# Patient Record
Sex: MALE | Race: WHITE | NOT HISPANIC OR LATINO
[De-identification: names, ages, dates, MRNs, and addresses within clinical notes are randomized per-mention and may not be internally consistent; named-entity substitution may affect disease eponyms.]

---

## 2020-09-10 PROBLEM — Z00.00 ENCOUNTER FOR PREVENTIVE HEALTH EXAMINATION: Status: ACTIVE | Noted: 2020-09-10

## 2020-09-12 ENCOUNTER — APPOINTMENT (OUTPATIENT)
Dept: MRI IMAGING | Facility: CLINIC | Age: 60
End: 2020-09-12

## 2020-09-22 ENCOUNTER — OUTPATIENT (OUTPATIENT)
Dept: OUTPATIENT SERVICES | Facility: HOSPITAL | Age: 60
LOS: 1 days | End: 2020-09-22
Payer: COMMERCIAL

## 2020-09-22 ENCOUNTER — APPOINTMENT (OUTPATIENT)
Dept: MRI IMAGING | Facility: CLINIC | Age: 60
End: 2020-09-22
Payer: COMMERCIAL

## 2020-09-22 ENCOUNTER — RESULT REVIEW (OUTPATIENT)
Age: 60
End: 2020-09-22

## 2020-09-22 DIAGNOSIS — Z00.8 ENCOUNTER FOR OTHER GENERAL EXAMINATION: ICD-10-CM

## 2020-09-22 PROCEDURE — 73720 MRI LWR EXTREMITY W/O&W/DYE: CPT

## 2020-09-22 PROCEDURE — 73720 MRI LWR EXTREMITY W/O&W/DYE: CPT | Mod: 26,LT

## 2020-09-22 PROCEDURE — A9585: CPT

## 2020-09-23 ENCOUNTER — INPATIENT (INPATIENT)
Facility: HOSPITAL | Age: 60
LOS: 3 days | Discharge: HOME | DRG: 463 | End: 2020-09-27
Attending: INTERNAL MEDICINE | Admitting: INTERNAL MEDICINE
Payer: COMMERCIAL

## 2020-09-23 VITALS
WEIGHT: 235.01 LBS | OXYGEN SATURATION: 99 % | SYSTOLIC BLOOD PRESSURE: 133 MMHG | HEART RATE: 77 BPM | RESPIRATION RATE: 18 BRPM | HEIGHT: 74 IN | TEMPERATURE: 99 F | DIASTOLIC BLOOD PRESSURE: 86 MMHG

## 2020-09-23 DIAGNOSIS — M86.9 OSTEOMYELITIS, UNSPECIFIED: ICD-10-CM

## 2020-09-23 LAB
ALBUMIN SERPL ELPH-MCNC: 4.4 G/DL — SIGNIFICANT CHANGE UP (ref 3.3–5)
ALP SERPL-CCNC: 67 U/L — SIGNIFICANT CHANGE UP (ref 40–120)
ALT FLD-CCNC: 27 U/L — SIGNIFICANT CHANGE UP (ref 10–45)
ANION GAP SERPL CALC-SCNC: 12 MMOL/L — SIGNIFICANT CHANGE UP (ref 5–17)
APTT BLD: 33.3 SEC — SIGNIFICANT CHANGE UP (ref 27.5–35.5)
AST SERPL-CCNC: 37 U/L — SIGNIFICANT CHANGE UP (ref 10–40)
BASOPHILS # BLD AUTO: 0.13 K/UL — SIGNIFICANT CHANGE UP (ref 0–0.2)
BASOPHILS NFR BLD AUTO: 1.6 % — SIGNIFICANT CHANGE UP (ref 0–2)
BILIRUB SERPL-MCNC: 0.4 MG/DL — SIGNIFICANT CHANGE UP (ref 0.2–1.2)
BLD GP AB SCN SERPL QL: NEGATIVE — SIGNIFICANT CHANGE UP
BUN SERPL-MCNC: 8 MG/DL — SIGNIFICANT CHANGE UP (ref 7–23)
CALCIUM SERPL-MCNC: 10 MG/DL — SIGNIFICANT CHANGE UP (ref 8.4–10.5)
CHLORIDE SERPL-SCNC: 93 MMOL/L — LOW (ref 96–108)
CO2 SERPL-SCNC: 25 MMOL/L — SIGNIFICANT CHANGE UP (ref 22–31)
CREAT SERPL-MCNC: 0.89 MG/DL — SIGNIFICANT CHANGE UP (ref 0.5–1.3)
EOSINOPHIL # BLD AUTO: 0.07 K/UL — SIGNIFICANT CHANGE UP (ref 0–0.5)
EOSINOPHIL NFR BLD AUTO: 0.9 % — SIGNIFICANT CHANGE UP (ref 0–6)
GLUCOSE SERPL-MCNC: 85 MG/DL — SIGNIFICANT CHANGE UP (ref 70–99)
HCT VFR BLD CALC: 35.5 % — LOW (ref 39–50)
HGB BLD-MCNC: 12.4 G/DL — LOW (ref 13–17)
IMM GRANULOCYTES NFR BLD AUTO: 0.2 % — SIGNIFICANT CHANGE UP (ref 0–1.5)
INR BLD: 0.93 RATIO — SIGNIFICANT CHANGE UP (ref 0.88–1.16)
LYMPHOCYTES # BLD AUTO: 2.15 K/UL — SIGNIFICANT CHANGE UP (ref 1–3.3)
LYMPHOCYTES # BLD AUTO: 26.5 % — SIGNIFICANT CHANGE UP (ref 13–44)
MCHC RBC-ENTMCNC: 33.1 PG — SIGNIFICANT CHANGE UP (ref 27–34)
MCHC RBC-ENTMCNC: 34.9 GM/DL — SIGNIFICANT CHANGE UP (ref 32–36)
MCV RBC AUTO: 94.7 FL — SIGNIFICANT CHANGE UP (ref 80–100)
MONOCYTES # BLD AUTO: 1.18 K/UL — HIGH (ref 0–0.9)
MONOCYTES NFR BLD AUTO: 14.5 % — HIGH (ref 2–14)
NEUTROPHILS # BLD AUTO: 4.56 K/UL — SIGNIFICANT CHANGE UP (ref 1.8–7.4)
NEUTROPHILS NFR BLD AUTO: 56.3 % — SIGNIFICANT CHANGE UP (ref 43–77)
NRBC # BLD: 0 /100 WBCS — SIGNIFICANT CHANGE UP (ref 0–0)
PLATELET # BLD AUTO: 196 K/UL — SIGNIFICANT CHANGE UP (ref 150–400)
POTASSIUM SERPL-MCNC: 4.4 MMOL/L — SIGNIFICANT CHANGE UP (ref 3.5–5.3)
POTASSIUM SERPL-SCNC: 4.4 MMOL/L — SIGNIFICANT CHANGE UP (ref 3.5–5.3)
PROT SERPL-MCNC: 7.2 G/DL — SIGNIFICANT CHANGE UP (ref 6–8.3)
PROTHROM AB SERPL-ACNC: 11.1 SEC — SIGNIFICANT CHANGE UP (ref 10.6–13.6)
RBC # BLD: 3.75 M/UL — LOW (ref 4.2–5.8)
RBC # FLD: 12.3 % — SIGNIFICANT CHANGE UP (ref 10.3–14.5)
RH IG SCN BLD-IMP: NEGATIVE — SIGNIFICANT CHANGE UP
SARS-COV-2 RNA SPEC QL NAA+PROBE: DETECTED
SODIUM SERPL-SCNC: 130 MMOL/L — LOW (ref 135–145)
WBC # BLD: 8.11 K/UL — SIGNIFICANT CHANGE UP (ref 3.8–10.5)
WBC # FLD AUTO: 8.11 K/UL — SIGNIFICANT CHANGE UP (ref 3.8–10.5)

## 2020-09-23 PROCEDURE — 71045 X-RAY EXAM CHEST 1 VIEW: CPT | Mod: 26

## 2020-09-23 PROCEDURE — 93010 ELECTROCARDIOGRAM REPORT: CPT

## 2020-09-23 PROCEDURE — 73630 X-RAY EXAM OF FOOT: CPT | Mod: 26,LT

## 2020-09-23 PROCEDURE — 99285 EMERGENCY DEPT VISIT HI MDM: CPT

## 2020-09-23 RX ORDER — LOSARTAN POTASSIUM 100 MG/1
50 TABLET, FILM COATED ORAL
Refills: 0 | Status: DISCONTINUED | OUTPATIENT
Start: 2020-09-23 | End: 2020-09-24

## 2020-09-23 RX ORDER — VANCOMYCIN HCL 1 G
1000 VIAL (EA) INTRAVENOUS ONCE
Refills: 0 | Status: COMPLETED | OUTPATIENT
Start: 2020-09-23 | End: 2020-09-23

## 2020-09-23 RX ORDER — CEFEPIME 1 G/1
1000 INJECTION, POWDER, FOR SOLUTION INTRAMUSCULAR; INTRAVENOUS EVERY 8 HOURS
Refills: 0 | Status: DISCONTINUED | OUTPATIENT
Start: 2020-09-23 | End: 2020-09-25

## 2020-09-23 RX ORDER — CEFEPIME 1 G/1
INJECTION, POWDER, FOR SOLUTION INTRAMUSCULAR; INTRAVENOUS
Refills: 0 | Status: DISCONTINUED | OUTPATIENT
Start: 2020-09-23 | End: 2020-09-23

## 2020-09-23 RX ORDER — HYDROCHLOROTHIAZIDE 25 MG
12.5 TABLET ORAL DAILY
Refills: 0 | Status: DISCONTINUED | OUTPATIENT
Start: 2020-09-23 | End: 2020-09-24

## 2020-09-23 RX ORDER — LOSARTAN POTASSIUM 100 MG/1
12.5 TABLET, FILM COATED ORAL ONCE
Refills: 0 | Status: COMPLETED | OUTPATIENT
Start: 2020-09-23 | End: 2020-09-23

## 2020-09-23 RX ORDER — INFLUENZA VIRUS VACCINE 15; 15; 15; 15 UG/.5ML; UG/.5ML; UG/.5ML; UG/.5ML
0.5 SUSPENSION INTRAMUSCULAR ONCE
Refills: 0 | Status: COMPLETED | OUTPATIENT
Start: 2020-09-23 | End: 2020-09-23

## 2020-09-23 RX ORDER — CEFEPIME 1 G/1
2000 INJECTION, POWDER, FOR SOLUTION INTRAMUSCULAR; INTRAVENOUS ONCE
Refills: 0 | Status: COMPLETED | OUTPATIENT
Start: 2020-09-23 | End: 2020-09-23

## 2020-09-23 RX ORDER — HEPARIN SODIUM 5000 [USP'U]/ML
5000 INJECTION INTRAVENOUS; SUBCUTANEOUS EVERY 12 HOURS
Refills: 0 | Status: DISCONTINUED | OUTPATIENT
Start: 2020-09-23 | End: 2020-09-27

## 2020-09-23 RX ORDER — CEFEPIME 1 G/1
2000 INJECTION, POWDER, FOR SOLUTION INTRAMUSCULAR; INTRAVENOUS EVERY 12 HOURS
Refills: 0 | Status: DISCONTINUED | OUTPATIENT
Start: 2020-09-23 | End: 2020-09-23

## 2020-09-23 RX ORDER — AMLODIPINE BESYLATE 2.5 MG/1
2.5 TABLET ORAL DAILY
Refills: 0 | Status: DISCONTINUED | OUTPATIENT
Start: 2020-09-23 | End: 2020-09-24

## 2020-09-23 RX ORDER — LOSARTAN POTASSIUM 100 MG/1
50 TABLET, FILM COATED ORAL DAILY
Refills: 0 | Status: DISCONTINUED | OUTPATIENT
Start: 2020-09-23 | End: 2020-09-23

## 2020-09-23 RX ADMIN — LOSARTAN POTASSIUM 50 MILLIGRAM(S): 100 TABLET, FILM COATED ORAL at 18:35

## 2020-09-23 RX ADMIN — CEFEPIME 100 MILLIGRAM(S): 1 INJECTION, POWDER, FOR SOLUTION INTRAMUSCULAR; INTRAVENOUS at 12:25

## 2020-09-23 RX ADMIN — HEPARIN SODIUM 5000 UNIT(S): 5000 INJECTION INTRAVENOUS; SUBCUTANEOUS at 17:33

## 2020-09-23 RX ADMIN — AMLODIPINE BESYLATE 2.5 MILLIGRAM(S): 2.5 TABLET ORAL at 17:33

## 2020-09-23 RX ADMIN — LOSARTAN POTASSIUM 12.5 MILLIGRAM(S): 100 TABLET, FILM COATED ORAL at 13:25

## 2020-09-23 RX ADMIN — CEFEPIME 100 MILLIGRAM(S): 1 INJECTION, POWDER, FOR SOLUTION INTRAMUSCULAR; INTRAVENOUS at 21:39

## 2020-09-23 RX ADMIN — Medication 12.5 MILLIGRAM(S): at 17:33

## 2020-09-23 RX ADMIN — Medication 250 MILLIGRAM(S): at 13:25

## 2020-09-23 NOTE — CONSULT NOTE ADULT - ASSESSMENT
60y Male complaining of foot injury. presenting with left third toe infection diagnosed by MRI to have osteomyelitis a couple days a now presenting for surgery with podiatry dr. sheikh,      no other complaints, afebrile, not currently on antibiotics notes he just ttok ? ab, a week ago.   Denies any pain in the foot, declines medications for pain, no other complaints at this time.   ot with long history htn , last stress test years ago, not very active, but no chest pain.  increase losartan to 50 mg bid for better bp control  lipid  tsh  dvt prophylaxis  pt will be clear for foot surgery  awaiting ecg  doubt PVD , pt with excellent pulses

## 2020-09-23 NOTE — CONSULT NOTE ADULT - SUBJECTIVE AND OBJECTIVE BOX
CHIEF COMPLAINT:Patient is a 60y old  Male who presents with a chief complaint of osteo  toe (23 Sep 2020 15:42)      HPI:    Chief Complaint: foot injury.    60y Male complaining of foot injury.presenting with left third toe infection diagnosed by MRI to have osteomyelitis a couple days a now presenting for surgery with podiatry dr. sheikh,      no other complaints, afebrile, not currently on antibiotics notes he just ttok ? ab, a week ago.   Denies any pain in the foot, declines medications for pain, no other complaints at this time.   ot with long history htn , last stress test years ago, not very active, but no chest pain.    PAST MEDICAL & SURGICAL HISTORY:  HTN (hypertension)    No significant past surgical history        MEDICATIONS  (STANDING):  amLODIPine   Tablet 2.5 milliGRAM(s) Oral daily  cefepime   IVPB 1000 milliGRAM(s) IV Intermittent every 8 hours  heparin   Injectable 5000 Unit(s) SubCutaneous every 12 hours  hydrochlorothiazide 12.5 milliGRAM(s) Oral daily  influenza   Vaccine 0.5 milliLiter(s) IntraMuscular once  losartan 50 milliGRAM(s) Oral daily    MEDICATIONS  (PRN):      FAMILY HISTORY:      SOCIAL HISTORY:    [ ] Non-smoker  [ ] Smoker  [ ] Alcohol    Allergies    penicillin (Unknown)    Intolerances    	    REVIEW OF SYSTEMS:  CONSTITUTIONAL: No fever, weight loss, or fatigue  EYES: No eye pain, visual disturbances, or discharge  ENT:  No difficulty hearing, tinnitus, vertigo; No sinus or throat pain  NECK: No pain or stiffness  RESPIRATORY: No cough, wheezing, chills or hemoptysis; No Shortness of Breath  CARDIOVASCULAR: No chest pain, palpitations, passing out, dizziness, or leg swelling  GASTROINTESTINAL: No abdominal or epigastric pain. No nausea, vomiting, or hematemesis; No diarrhea or constipation. No melena or hematochezia.  GENITOURINARY: No dysuria, frequency, hematuria, or incontinence  NEUROLOGICAL: No headaches, memory loss, loss of strength, numbness, or tremors  SKIN: No itching, burning, rashes, or lesions   LYMPH Nodes: No enlarged glands  ENDOCRINE: No heat or cold intolerance; No hair loss  MUSCULOSKELETAL: No joint pain or swelling; No muscle, back, + extremity pain/ l toe  PSYCHIATRIC: No depression, anxiety, mood swings, or difficulty sleeping  HEME/LYMPH: No easy bruising, or bleeding gums  ALLERGY AND IMMUNOLOGIC: No hives or eczema	    [ ] All others negative	  [ ] Unable to obtain    PHYSICAL EXAM:  T(C): 37 (09-23-20 @ 16:39), Max: 37 (09-23-20 @ 09:46)  HR: 90 (09-23-20 @ 16:46) (75 - 90)  BP: 170/110 (09-23-20 @ 16:46) (133/86 - 172/103)  RR: 18 (09-23-20 @ 16:46) (18 - 19)  SpO2: 98% (09-23-20 @ 16:46) (98% - 100%)  Wt(kg): --  I&O's Summary      Appearance: Normal	  HEENT:   Normal oral mucosa, PERRL, EOMI	  Lymphatic: No lymphadenopathy  Cardiovascular: Normal S1 S2, No JVD, + murmurs, No edema  Respiratory: Lungs clear to auscultation	  Psychiatry: A & O x 3, Mood & affect appropriate  Gastrointestinal:  Soft, Non-tender, + BS	  Skin: No rashes, No ecchymoses, No cyanosis	  Neurologic: Non-focal  Extremities: Normal range of motion, No clubbing, cyanosis or edema  Vascular: Peripheral pulses palpable 2+ bilaterally    TELEMETRY: 	    ECG:  	  RADIOLOGY:  OTHER: 	  	  LABS:	 	    CARDIAC MARKERS:                              12.4   8.11  )-----------( 196      ( 23 Sep 2020 11:41 )             35.5     09-23    130<L>  |  93<L>  |  8   ----------------------------<  85  4.4   |  25  |  0.89    Ca    10.0      23 Sep 2020 11:41    TPro  7.2  /  Alb  4.4  /  TBili  0.4  /  DBili  x   /  AST  37  /  ALT  27  /  AlkPhos  67  09-23    proBNP:   Lipid Profile:   HgA1c:   TSH:   PT/INR - ( 23 Sep 2020 11:41 )   PT: 11.1 sec;   INR: 0.93 ratio         PTT - ( 23 Sep 2020 11:41 )  PTT:33.3 sec    PREVIOUS DIAGNOSTIC TESTING:    COVID-19 PCR . (09.23.20 @ 12:19)    COVID-19 PCR: Detected: This test has been validated by [employer] to be accurate;  though it has not been FDA cleared/approved by the usual pathway.  As with all laboratory tests, results should be correlated with clinical  findings.  https://www.fda.gov/media/009095/download  https://www.fda.gov/media/427304/download    < from: MR Walls w/wo IV Cont, Left (09.22.20 @ 11:58) >  1. Osteomyelitis of the third distal phalanx with faint edema of the third middle phalanx that may be reactive or reflect additional infection.  2. No osteomyelitis of the second toe.

## 2020-09-23 NOTE — H&P ADULT - NSHPPHYSICALEXAM_GEN_ALL_CORE
PHYSICAL EXAMINATION:  Vital Signs Last 24 Hrs  T(C): 36.8 (23 Sep 2020 13:10), Max: 37 (23 Sep 2020 09:46)  T(F): 98.2 (23 Sep 2020 13:10), Max: 98.6 (23 Sep 2020 09:46)  HR: 75 (23 Sep 2020 13:10) (75 - 77)  BP: 172/103 (23 Sep 2020 13:10) (133/86 - 172/103)  BP(mean): --  RR: 19 (23 Sep 2020 13:10) (18 - 19)  SpO2: 100% (23 Sep 2020 13:10) (99% - 100%)  CAPILLARY BLOOD GLUCOSE            GENERAL: NAD, well-groomed,  HEAD:  atraumatic, normocephalic  EYES: sclera anicteric  ENMT: mucous membranes moist  NECK: supple, No JVD  CHEST/LUNG: clear to auscultation bilaterally;    no      rales   ,   no rhonchi,   HEART: normal S1, S2  ABDOMEN: BS+, soft, ND, NT   EXTREMITIES:    no    edema    b/l LEs  ulcer.  left  3rd  toe  NEURO: awake, ,     moves all extremities  SKIN: no     rash

## 2020-09-23 NOTE — CONSULT NOTE ADULT - SUBJECTIVE AND OBJECTIVE BOX
HPI:   Patient is a 60y male with a past history of HTN who was sent to ER for definitive treatment of left 3rd toe osteo.  He has a toes ulcer x weeks, treated with oral antibiotics at home.No fever or chills.MRI a few days ago with osteo of left al 3rd toe.  He was evaluated by podiatry, wound probed, and debrided, cultures sent, vanco and cefepime advised.  His NP Covid 19 PCR is positive, no known positive contacts or respiratory symptoms.    REVIEW OF SYSTEMS:  All other review of systems negative (Comprehensive ROS)    PAST MEDICAL & SURGICAL HISTORY:  HTN (hypertension)    No significant past surgical history        Allergies    penicillin (Unknown)    Intolerances        Antimicrobials Day #  :day 1  cefepime   IVPB 1000 milliGRAM(s) IV Intermittent every 8 hours    Other Medications:  amLODIPine   Tablet 2.5 milliGRAM(s) Oral daily  heparin   Injectable 5000 Unit(s) SubCutaneous every 12 hours  hydrochlorothiazide 12.5 milliGRAM(s) Oral daily  losartan 50 milliGRAM(s) Oral daily      FAMILY HISTORY:NC      SOCIAL HISTORY:  Smoking: ex  ETOH:  socialg Use: x   Single     T(F): 97.6 (09-23-20 @ 14:41), Max: 98.6 (09-23-20 @ 09:46)  HR: 77 (09-23-20 @ 14:41)  BP: 166/104 (09-23-20 @ 14:41)  RR: 18 (09-23-20 @ 14:41)  SpO2: 98% (09-23-20 @ 14:41)  Wt(kg): --    PHYSICAL EXAM:  General: alert, no acute distress  Eyes:  anicteric, no conjunctival injection, no discharge  Oropharynx: no lesions or injection 	  Neck: supple, without adenopathy  Lungs: clear to auscultation  Heart: regular rate and rhythm; no murmur, rubs or gallops  Abdomen: soft, nondistended, nontender, without mass or organomegaly  Skin: no lesions  Extremities: no clubbing, cyanosis, or edema  Neurologic: alert, oriented, moves all extremities  Lt 3rd toe with distal 2x1 cm wound, no pus, swollen digit, no cellulitis             12.4   8.11  )-----------( 196      ( 23 Sep 2020 11:41 )             35.5     09-23    130<L>  |  93<L>  |  8   ----------------------------<  85  4.4   |  25  |  0.89    Ca    10.0      23 Sep 2020 11:41    TPro  7.2  /  Alb  4.4  /  TBili  0.4  /  DBili  x   /  AST  37  /  ALT  27  /  AlkPhos  67  09-23    LIVER FUNCTIONS - ( 23 Sep 2020 11:41 )  Alb: 4.4 g/dL / Pro: 7.2 g/dL / ALK PHOS: 67 U/L / ALT: 27 U/L / AST: 37 U/L / GGT: x               MICROBIOLOGY:  RECENT CULTURES:        RADIOLOGY REVIEWED:   < from: MR Foot w/wo IV Cont, Left (09.22.20 @ 11:58) >  IMPRESSION:  1. Osteomyelitis of the third distal phalanx with faint edema of the third middle phalanx that may be reactive or reflect additional infection.  2. No osteomyelitis of the second toe.  3. An email was sent to Dr. SHIRLENE TELLO on 9/22/2020 12:48 PM.    CXR preliminary: negative

## 2020-09-23 NOTE — CONSULT NOTE ADULT - ASSESSMENT
60M w/ L foot distal digital wound  - Pt seen and evaluated  - Afebrile, labs pending  - L foot distal 3rd digit wound, 2.0x1.0cm, depth to bone wound bed fibrogranular, no malodor, periwound erythematous, etiology 2/2 pressure, neuropathy  - Excisional debridement of L foot distal digital wound to the level of but not beyond subq with sanguinous drainage, culture obtained  - Rec start vanco/cefepime as patient has digital dactylitis w/ erythema to MPJ  - Booked for L foot partial third ray resection w/ Dr. Wolff at 12:30PM  - Please document medical clearance / optimization for procedure with light sedation  - d/w attending 60M w/ L foot distal digital wound  - Pt seen and evaluated  - Afebrile, labs pending  - L foot distal 3rd digit wound, 2.0x1.0cm, depth to bone wound bed fibrogranular, no malodor, periwound erythematous, etiology 2/2 pressure, neuropathy  - Excisional debridement of L foot distal digital wound to the level of but not beyond subq with sanguinous drainage, culture obtained  - Rec start vanco/cefepime as patient has digital dactylitis w/ erythema to MPJ  - Booked for L foot partial third ray resection w/ Dr. Wolff at 12:30PM Friday 9/25/20  - Please document medical clearance / optimization for procedure with light sedation  - d/w attending

## 2020-09-23 NOTE — ED ADULT NURSE NOTE - OBJECTIVE STATEMENT
61 y/o male with pmhx of htn sent by wound care MD for surgical cleaning of L 3rd toe.  per pt, his MRI revealed osteomyelitis.  erythema and edema with scaly skin noted to affected toe.  pt denies any fevers, chills or purulent exudate at this time.  pt is awake, alert and responsive to all stimuli.  no sob or respiratory distress noted.  vss.  pt able to ambulate fully bearing weight to affected foot.  pt resting in bed comfortably with safety precautions in place.  will continue to monitor.

## 2020-09-23 NOTE — H&P ADULT - ASSESSMENT
60M    h/o HTN    sent to e r  . 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 Osteomyelitis of the third distal phalanx with faint edema of the third middle phalanx that may be reactive or reflect additional infection.  2. No osteomyelitis of the second toe.  3. An email was sent to Dr. SHIRLENE TELLO on 9/22/2020 12:48 PM.  < end of copied text >

## 2020-09-23 NOTE — PROVIDER CONTACT NOTE (OTHER) - ACTION/TREATMENT ORDERED:
provider notified. amlodipine & hydrochlorothiazide. losartan give 30 mins later. will continue to monitor. reassess in 1 hour.

## 2020-09-23 NOTE — H&P ADULT - NSHPLABSRESULTS_GEN_ALL_CORE
LABS:                        12.4   8.11  )-----------( 196      ( 23 Sep 2020 11:41 )             35.5     09-23    130<L>  |  93<L>  |  8   ----------------------------<  85  4.4   |  25  |  0.89    Ca    10.0      23 Sep 2020 11:41    TPro  7.2  /  Alb  4.4  /  TBili  0.4  /  DBili  x   /  AST  37  /  ALT  27  /  AlkPhos  67  09-23    PT/INR - ( 23 Sep 2020 11:41 )   PT: 11.1 sec;   INR: 0.93 ratio         PTT - ( 23 Sep 2020 11:41 )  PTT:33.3 sec

## 2020-09-23 NOTE — CONSULT NOTE ADULT - ASSESSMENT
61 yo male with osteo of left 3rd toe.  No signs of systemic infection.  Covid PCR positive, asymptomatic.  CXR looks clear, no indication for treatment.  This could be old exposure, he is not aware of any infected contacts.  Will await serology, if positive it would suggest infection at least 10-14 days ago, however the positive PCR will mandate isolation regardless of antibody status.  Vanco and cefepime reasonable pending planned toe resection.  Culture sent today of wound can be used to adjust antibiotics.

## 2020-09-23 NOTE — CONSULT NOTE ADULT - SUBJECTIVE AND OBJECTIVE BOX
Patient is a 60y old  Male who presents with a chief complaint of     HPI:      PAST MEDICAL & SURGICAL HISTORY:  HTN (hypertension)    No significant past surgical history        MEDICATIONS  (STANDING):  cefepime   IVPB      vancomycin  IVPB 1000 milliGRAM(s) IV Intermittent once    MEDICATIONS  (PRN):      Allergies    penicillin (Unknown)    Intolerances        VITALS:    Vital Signs Last 24 Hrs  T(C): 37 (23 Sep 2020 09:46), Max: 37 (23 Sep 2020 09:46)  T(F): 98.6 (23 Sep 2020 09:46), Max: 98.6 (23 Sep 2020 09:46)  HR: 77 (23 Sep 2020 09:46) (77 - 77)  BP: 133/86 (23 Sep 2020 09:46) (133/86 - 133/86)  BP(mean): --  RR: 18 (23 Sep 2020 09:46) (18 - 18)  SpO2: 99% (23 Sep 2020 09:46) (99% - 99%)    LABS:                CAPILLARY BLOOD GLUCOSE              LOWER EXTREMITY PHYSICAL EXAM:    Vascular: DP/PT 2/4, B/L, CFT <3 seconds B/L, Temperature gradient warm to cool, B/L.   Neuro: Epicritic sensation absent to the level of digits, B/L.  Musculoskeletal/Ortho: L foot 3rd digit dactylitis  Skin: L foot distal 3rd digit wound, 2.0x1.0cm, depth to bone wound bed fibrogranular, no malodor, periwound erythematous, etiology 2/2 pressure, neuropathy    RADIOLOGY & ADDITIONAL STUDIES:

## 2020-09-23 NOTE — CONSULT NOTE ADULT - SUBJECTIVE AND OBJECTIVE BOX
SMILEY HENDERSON 60y Male  MRN-[id number]    Patient is a 60y old  Male who presents with a chief complaint of osteo  toe (23 Sep 2020 13:32)      HPI:    Chief Complaint: foot injury.    60y Male complaining of foot injury.    : Pmhx HTN      presenting with left third toe infection diagnosed by MRI to have osteomyelitis a couple days ago,      now presenting for surgery with podiatry dr. sheikh,      no other complaints, afebrile, not currently on antibiotics notes he just ttok ? ab, a week ago.   Denies any pain in the foot, declines medications for pain, no other complaints at this time.   (23 Sep 2020 13:32)      PAST MEDICAL & SURGICAL HISTORY:  HTN (hypertension)    No significant past surgical history        Allergies    penicillin (Unknown)    Intolerances        ANTIMICROBIALS:  cefepime   IVPB 1000 every 8 hours      MEDICATIONS  (STANDING):  amLODIPine   Tablet 2.5 milliGRAM(s) Oral daily  cefepime   IVPB 1000 milliGRAM(s) IV Intermittent every 8 hours  heparin   Injectable 5000 Unit(s) SubCutaneous every 12 hours  hydrochlorothiazide 12.5 milliGRAM(s) Oral daily  losartan 50 milliGRAM(s) Oral daily      Social History  Smoking:  Etoh:  Drug use:      FAMILY HISTORY:      Vital Signs Last 24 Hrs  T(C): 36.4 (23 Sep 2020 14:41), Max: 37 (23 Sep 2020 09:46)  T(F): 97.6 (23 Sep 2020 14:41), Max: 98.6 (23 Sep 2020 09:46)  HR: 77 (23 Sep 2020 14:41) (75 - 77)  BP: 166/104 (23 Sep 2020 14:41) (133/86 - 172/103)  BP(mean): --  RR: 18 (23 Sep 2020 14:41) (18 - 19)  SpO2: 98% (23 Sep 2020 14:41) (98% - 100%)    CBC Full  -  ( 23 Sep 2020 11:41 )  WBC Count : 8.11 K/uL  RBC Count : 3.75 M/uL  Hemoglobin : 12.4 g/dL  Hematocrit : 35.5 %  Platelet Count - Automated : 196 K/uL  Mean Cell Volume : 94.7 fl  Mean Cell Hemoglobin : 33.1 pg  Mean Cell Hemoglobin Concentration : 34.9 gm/dL  Auto Neutrophil # : 4.56 K/uL  Auto Lymphocyte # : 2.15 K/uL  Auto Monocyte # : 1.18 K/uL  Auto Eosinophil # : 0.07 K/uL  Auto Basophil # : 0.13 K/uL  Auto Neutrophil % : 56.3 %  Auto Lymphocyte % : 26.5 %  Auto Monocyte % : 14.5 %  Auto Eosinophil % : 0.9 %  Auto Basophil % : 1.6 %    09-23    130<L>  |  93<L>  |  8   ----------------------------<  85  4.4   |  25  |  0.89    Ca    10.0      23 Sep 2020 11:41    TPro  7.2  /  Alb  4.4  /  TBili  0.4  /  DBili  x   /  AST  37  /  ALT  27  /  AlkPhos  67  09-23    LIVER FUNCTIONS - ( 23 Sep 2020 11:41 )  Alb: 4.4 g/dL / Pro: 7.2 g/dL / ALK PHOS: 67 U/L / ALT: 27 U/L / AST: 37 U/L / GGT: x               MICROBIOLOGY:    COVID-19 PCR . (09.23.20 @ 12:19)   COVID-19 PCR: Detected: This test has been validated by [employer] to be accurate;   though it has not been FDA cleared/approved by the usual pathway.   As with all laboratory tests, results should be correlated with clinical   findings.       RADIOLOGY  < from: MR Foot w/wo IV Cont, Left (09.22.20 @ 11:58) >  1. Osteomyelitis of the third distal phalanx with faint edema of the third middle phalanx that may be reactive or reflect additional infection.  2. No osteomyelitis of the second toe.    < end of copied text >

## 2020-09-23 NOTE — ED PROVIDER NOTE - PHYSICAL EXAMINATION
Gen: NAD, non-toxic, conversational  Eyes: PERRLA, EOMI   HENT: Normocephalic, atraumatic. External ears normal, no rhinorrhea, moist mucous membranes.   CV: RRR, no M/R/G  Resp: CTAB, non-labored, speaking without difficulty on room air  Abd: soft, non tender, non rigid, no guarding or rebound tenderness  Back: No CVAT bilaterally, no midline ttp  Skin: dry, wwp   Ext: left third toe with dry necrotic appearing base, no purulence or drainage, mildly swollen, able to move in good range of motion, DP intact on ipsilateral and contrallateral sides.   Neuro: AOx3, speech is fluent and appropriate  Psych: Mood "okay", affect euthymic

## 2020-09-23 NOTE — ED PROVIDER NOTE - CLINICAL SUMMARY MEDICAL DECISION MAKING FREE TEXT BOX
60M hx of left hip surg approx 1 mo ago well healing, now with toe infection, found to have osteomyelitis on MRI a couple days ago now presenting for surgery with Dr. Smith, no other complaints at this time, podiatry aware of him pt to be admitted for treatment, will order preops, ekg, admit.

## 2020-09-23 NOTE — ED PROVIDER NOTE - OBJECTIVE STATEMENT
Pmhx HTN presenting with left third toe infection diagnosed by MRI to have osteomyelitis a couple days ago, now presenting for surgery with podiatry dr. sheikh, no other complaints, afebrile, not currently on antibiotics notes he just finished his course a week ago. Denies any pain in the foot, declines medications for pain, no other complaints at this time.

## 2020-09-23 NOTE — H&P ADULT - HISTORY OF PRESENT ILLNESS
Chief Complaint: foot injury.    60y Male complaining of foot injury.    : Pmhx HTN      presenting with left third toe infection diagnosed by MRI to have osteomyelitis a couple days ago,      now presenting for surgery with podiatry dr. sheikh,      no other complaints, afebrile, not currently on antibiotics notes he just ttok ? ab, a week ago.   Denies any pain in the foot, declines medications for pain, no other complaints at this time.

## 2020-09-23 NOTE — PROVIDER CONTACT NOTE (OTHER) - ASSESSMENT
Pt is resting in bed denies any pain. BP is 172/103. Pt denies HA, dizziness, or blurred vision. Pt states he takes losartan at home and did not have a chance to take it today.

## 2020-09-24 LAB
ANION GAP SERPL CALC-SCNC: 14 MMOL/L — SIGNIFICANT CHANGE UP (ref 5–17)
BUN SERPL-MCNC: 10 MG/DL — SIGNIFICANT CHANGE UP (ref 7–23)
CALCIUM SERPL-MCNC: 9.9 MG/DL — SIGNIFICANT CHANGE UP (ref 8.4–10.5)
CHLORIDE SERPL-SCNC: 92 MMOL/L — LOW (ref 96–108)
CHOLEST SERPL-MCNC: 168 MG/DL — SIGNIFICANT CHANGE UP (ref 10–199)
CO2 SERPL-SCNC: 23 MMOL/L — SIGNIFICANT CHANGE UP (ref 22–31)
CREAT SERPL-MCNC: 0.79 MG/DL — SIGNIFICANT CHANGE UP (ref 0.5–1.3)
CRP SERPL-MCNC: 0.41 MG/DL — HIGH (ref 0–0.4)
ERYTHROCYTE [SEDIMENTATION RATE] IN BLOOD: 40 MM/HR — HIGH (ref 0–20)
GLUCOSE SERPL-MCNC: 103 MG/DL — HIGH (ref 70–99)
HCT VFR BLD CALC: 34.9 % — LOW (ref 39–50)
HCV AB S/CO SERPL IA: 0.08 S/CO — SIGNIFICANT CHANGE UP (ref 0–0.99)
HCV AB SERPL-IMP: SIGNIFICANT CHANGE UP
HDLC SERPL-MCNC: 72 MG/DL — SIGNIFICANT CHANGE UP
HGB BLD-MCNC: 12.7 G/DL — LOW (ref 13–17)
LIPID PNL WITH DIRECT LDL SERPL: 85 MG/DL — SIGNIFICANT CHANGE UP
MCHC RBC-ENTMCNC: 34 PG — SIGNIFICANT CHANGE UP (ref 27–34)
MCHC RBC-ENTMCNC: 36.4 GM/DL — HIGH (ref 32–36)
MCV RBC AUTO: 93.6 FL — SIGNIFICANT CHANGE UP (ref 80–100)
NRBC # BLD: 0 /100 WBCS — SIGNIFICANT CHANGE UP (ref 0–0)
PLATELET # BLD AUTO: 176 K/UL — SIGNIFICANT CHANGE UP (ref 150–400)
POTASSIUM SERPL-MCNC: 3.9 MMOL/L — SIGNIFICANT CHANGE UP (ref 3.5–5.3)
POTASSIUM SERPL-SCNC: 3.9 MMOL/L — SIGNIFICANT CHANGE UP (ref 3.5–5.3)
RBC # BLD: 3.73 M/UL — LOW (ref 4.2–5.8)
RBC # FLD: 12.2 % — SIGNIFICANT CHANGE UP (ref 10.3–14.5)
SODIUM SERPL-SCNC: 129 MMOL/L — LOW (ref 135–145)
TOTAL CHOLESTEROL/HDL RATIO MEASUREMENT: 2.3 RATIO — LOW (ref 3.4–9.6)
TRIGL SERPL-MCNC: 58 MG/DL — SIGNIFICANT CHANGE UP (ref 10–149)
TSH SERPL-MCNC: 1.35 UIU/ML — SIGNIFICANT CHANGE UP (ref 0.27–4.2)
WBC # BLD: 5.85 K/UL — SIGNIFICANT CHANGE UP (ref 3.8–10.5)
WBC # FLD AUTO: 5.85 K/UL — SIGNIFICANT CHANGE UP (ref 3.8–10.5)

## 2020-09-24 RX ORDER — AMLODIPINE BESYLATE 2.5 MG/1
5 TABLET ORAL DAILY
Refills: 0 | Status: DISCONTINUED | OUTPATIENT
Start: 2020-09-24 | End: 2020-09-27

## 2020-09-24 RX ORDER — LOSARTAN POTASSIUM 100 MG/1
50 TABLET, FILM COATED ORAL
Refills: 0 | Status: DISCONTINUED | OUTPATIENT
Start: 2020-09-24 | End: 2020-09-27

## 2020-09-24 RX ORDER — SODIUM CHLORIDE 9 MG/ML
1000 INJECTION INTRAMUSCULAR; INTRAVENOUS; SUBCUTANEOUS
Refills: 0 | Status: DISCONTINUED | OUTPATIENT
Start: 2020-09-24 | End: 2020-09-25

## 2020-09-24 RX ORDER — LOSARTAN POTASSIUM 100 MG/1
50 TABLET, FILM COATED ORAL DAILY
Refills: 0 | Status: DISCONTINUED | OUTPATIENT
Start: 2020-09-24 | End: 2020-09-24

## 2020-09-24 RX ORDER — CHLORHEXIDINE GLUCONATE 213 G/1000ML
1 SOLUTION TOPICAL ONCE
Refills: 0 | Status: COMPLETED | OUTPATIENT
Start: 2020-09-24 | End: 2020-09-24

## 2020-09-24 RX ADMIN — HEPARIN SODIUM 5000 UNIT(S): 5000 INJECTION INTRAVENOUS; SUBCUTANEOUS at 05:07

## 2020-09-24 RX ADMIN — HEPARIN SODIUM 5000 UNIT(S): 5000 INJECTION INTRAVENOUS; SUBCUTANEOUS at 17:35

## 2020-09-24 RX ADMIN — CEFEPIME 100 MILLIGRAM(S): 1 INJECTION, POWDER, FOR SOLUTION INTRAMUSCULAR; INTRAVENOUS at 13:12

## 2020-09-24 RX ADMIN — LOSARTAN POTASSIUM 50 MILLIGRAM(S): 100 TABLET, FILM COATED ORAL at 05:07

## 2020-09-24 RX ADMIN — LOSARTAN POTASSIUM 50 MILLIGRAM(S): 100 TABLET, FILM COATED ORAL at 17:35

## 2020-09-24 RX ADMIN — CHLORHEXIDINE GLUCONATE 1 APPLICATION(S): 213 SOLUTION TOPICAL at 21:50

## 2020-09-24 RX ADMIN — CEFEPIME 100 MILLIGRAM(S): 1 INJECTION, POWDER, FOR SOLUTION INTRAMUSCULAR; INTRAVENOUS at 05:04

## 2020-09-24 RX ADMIN — CEFEPIME 100 MILLIGRAM(S): 1 INJECTION, POWDER, FOR SOLUTION INTRAMUSCULAR; INTRAVENOUS at 21:48

## 2020-09-24 NOTE — PATIENT PROFILE ADULT - MONEY FOR FOOD
Lydia's case was discussed at Valve conference.    The plan will be for Lydia to proceed with TAVR     [facility] Valve  Size 23  Approach: TF  -----------------------------------------------  Date: 9/24/2020    Time of Call: 8:17 AM     Diagnosis:  Severe aortic stenosis     [ TORB ] Ordering provider: Leonard Pastor MD  Order:   PAC  CASE REQUEST:  TAVR       Order received by: Elizabeth Mata RN     _____________________________________            
no

## 2020-09-24 NOTE — PROGRESS NOTE ADULT - SUBJECTIVE AND OBJECTIVE BOX
CC: f/u for osteo toe    Patient reports    REVIEW OF SYSTEMS:  All other review of systems negative (Comprehensive ROS)    Antimicrobials Day #  :2  cefepime   IVPB 1000 milliGRAM(s) IV Intermittent every 8 hours    Other Medications Reviewed    T(F): 98.1 (09-24-20 @ 08:46), Max: 98.6 (09-23-20 @ 16:39)  HR: 99 (09-24-20 @ 08:46)  BP: 152/107 (09-24-20 @ 08:46)  RR: 18 (09-24-20 @ 08:46)  SpO2: 99% (09-24-20 @ 08:46)  Wt(kg): --    PHYSICAL EXAM:  General: alert, no acute distress  Eyes:  anicteric, no conjunctival injection, no discharge  Oropharynx: no lesions or injection 	  Neck: supple, without adenopathy  Lungs: clear to auscultation  Heart: regular rate and rhythm; no murmur, rubs or gallops  Abdomen: soft, nondistended, nontender, without mass or organomegaly  Skin: no lesions  Extremities: no clubbing, cyanosis, or edema. left 3rd toe ulcer, distal area swelling, no drainage  Neurologic: alert, oriented, moves all extremities    LAB RESULTS:                        12.7   5.85  )-----------( 176      ( 24 Sep 2020 05:20 )             34.9     09-24    129<L>  |  92<L>  |  10  ----------------------------<  103<H>  3.9   |  23  |  0.79    Ca    9.9      24 Sep 2020 05:20    TPro  7.2  /  Alb  4.4  /  TBili  0.4  /  DBili  x   /  AST  37  /  ALT  27  /  AlkPhos  67  09-23    LIVER FUNCTIONS - ( 23 Sep 2020 11:41 )  Alb: 4.4 g/dL / Pro: 7.2 g/dL / ALK PHOS: 67 U/L / ALT: 27 U/L / AST: 37 U/L / GGT: x             MICROBIOLOGY:  RECENT CULTURES:      RADIOLOGY REVIEWED:      < from: MR Foot w/wo IV Cont, Left (09.22.20 @ 11:58) >  IMPRESSION:  1. Osteomyelitis of the third distal phalanx with faint edema of the third middle phalanx that may be reactive or reflect additional infection.  2. No osteomyelitis of the second toe.  3. An email was sent to Dr. SHIRLENE TELLO on 9/22/2020 12:48 PM.        < end of copied text >          Assessment:  Patient with contiguous focus om of the left 3rd toe distal phalynx and assymptomatic or old covid. Cefepime is adequate coverage for now. No need for vancomycin  Plan:  continue cefepime  await podiatry plans for toe amputation  await covid ab, no indication for specific covid tx

## 2020-09-24 NOTE — PROGRESS NOTE ADULT - SUBJECTIVE AND OBJECTIVE BOX
CARDIOLOGY     PROGRESS  NOTE   ________________________________________________    CHIEF COMPLAINT:Patient is a 60y old  Male who presents with a chief complaint of osteo  toe (24 Sep 2020 08:07)    	  REVIEW OF SYSTEMS:  CONSTITUTIONAL: No fever, weight loss, or fatigue  EYES: No eye pain, visual disturbances, or discharge  ENT:  No difficulty hearing, tinnitus, vertigo; No sinus or throat pain  NECK: No pain or stiffness  RESPIRATORY: No cough, wheezing, chills or hemoptysis; No Shortness of Breath  CARDIOVASCULAR: No chest pain, palpitations, passing out, dizziness, or leg swelling  GASTROINTESTINAL: No abdominal or epigastric pain. No nausea, vomiting, or hematemesis; No diarrhea or constipation. No melena or hematochezia.  GENITOURINARY: No dysuria, frequency, hematuria, or incontinence  NEUROLOGICAL: No headaches, memory loss, loss of strength, numbness, or tremors  SKIN: No itching, burning, rashes, or lesions   LYMPH Nodes: No enlarged glands  ENDOCRINE: No heat or cold intolerance; No hair loss  MUSCULOSKELETAL: No joint pain or swelling; No muscle, back, or extremity pain  PSYCHIATRIC: No depression, anxiety, mood swings, or difficulty sleeping  HEME/LYMPH: No easy bruising, or bleeding gums  ALLERGY AND IMMUNOLOGIC: No hives or eczema	    [ ] All others negative	  [ ] Unable to obtain    PHYSICAL EXAM:  T(C): 36.5 (09-24-20 @ 04:48), Max: 37 (09-23-20 @ 09:46)  HR: 73 (09-24-20 @ 04:48) (73 - 90)  BP: 137/85 (09-24-20 @ 04:48) (133/86 - 172/103)  RR: 18 (09-24-20 @ 04:48) (18 - 19)  SpO2: 97% (09-24-20 @ 04:48) (97% - 100%)  Wt(kg): --  I&O's Summary    23 Sep 2020 07:01  -  24 Sep 2020 07:00  --------------------------------------------------------  IN: 0 mL / OUT: 575 mL / NET: -575 mL        Appearance: Normal	  HEENT:   Normal oral mucosa, PERRL, EOMI	  Lymphatic: No lymphadenopathy  Cardiovascular: Normal S1 S2, No JVD, No murmurs, No edema  Respiratory: Lungs clear to auscultation	  Psychiatry: A & O x 3, Mood & affect appropriate  Gastrointestinal:  Soft, Non-tender, + BS	  Skin: No rashes, No ecchymoses, No cyanosis	  Neurologic: Non-focal  Extremities: Normal range of motion, No clubbing, cyanosis or edema, +toe wound/osteo    MEDICATIONS  (STANDING):  amLODIPine   Tablet 5 milliGRAM(s) Oral daily  cefepime   IVPB 1000 milliGRAM(s) IV Intermittent every 8 hours  heparin   Injectable 5000 Unit(s) SubCutaneous every 12 hours  influenza   Vaccine 0.5 milliLiter(s) IntraMuscular once  losartan 50 milliGRAM(s) Oral two times a day      TELEMETRY: 	    ECG:  	  RADIOLOGY:  OTHER: 	  	  LABS:	 	    CARDIAC MARKERS:                                12.7   5.85  )-----------( 176      ( 24 Sep 2020 05:20 )             34.9     09-24    129<L>  |  92<L>  |  10  ----------------------------<  103<H>  3.9   |  23  |  0.79    Ca    9.9      24 Sep 2020 05:20    TPro  7.2  /  Alb  4.4  /  TBili  0.4  /  DBili  x   /  AST  37  /  ALT  27  /  AlkPhos  67  09-23    proBNP:   Lipid Profile:   HgA1c:   TSH:   PT/INR - ( 23 Sep 2020 11:41 )   PT: 11.1 sec;   INR: 0.93 ratio         PTT - ( 23 Sep 2020 11:41 )  PTT:33.3 sec      Assessment and plan  ---------------------------  60y Male complaining of foot injury. presenting with left third toe infection diagnosed by MRI to have osteomyelitis a couple days a now presenting for surgery with podiatry dr. sheikh,      no other complaints, afebrile, not currently on antibiotics notes he just ttok ? ab, a week ago.   Denies any pain in the foot, declines medications for pain, no other complaints at this time.   ot with long history htn , last stress test years ago, not very active, but no chest pain.  increase losartan to 50 mg bid for better bp control  lipid  tsh  dvt prophylaxis  pt is clear for foot surgery cardiac wise  awaiting ecg  doubt PVD , pt with excellent pulses   hyponatremia may consider iv hydration  bp is better controlled  continue abx

## 2020-09-24 NOTE — PROGRESS NOTE ADULT - ASSESSMENT
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 Osteomyelitis of the third distal phalanx with faint edema of the third middle phalanx that may be reactive or reflect additional infection.  2. No osteomyelitis of the second toe.  3. An email was sent to Dr. SHIRLENE TELLO on 9/22/2020 12:48 PM.  < end of copied text >

## 2020-09-24 NOTE — PROGRESS NOTE ADULT - SUBJECTIVE AND OBJECTIVE BOX
afberile/  room air  sat is  97      REVIEW OF SYSTEMS:  GEN: no fever,    no chills  RESP: no SOB,   no cough  CVS: no chest pain,   no palpitations  GI: no abdominal pain,   no nausea,   no vomiting,   no constipation,   no diarrhea  : no dysuria,   no frequency  NEURO: no headache,   no dizziness  PSYCH: no depression,   not anxious  Derm : no rash    MEDICATIONS  (STANDING):  amLODIPine   Tablet 5 milliGRAM(s) Oral daily  cefepime   IVPB 1000 milliGRAM(s) IV Intermittent every 8 hours  heparin   Injectable 5000 Unit(s) SubCutaneous every 12 hours  influenza   Vaccine 0.5 milliLiter(s) IntraMuscular once  losartan 50 milliGRAM(s) Oral two times a day    MEDICATIONS  (PRN):      Vital Signs Last 24 Hrs  T(C): 36.5 (24 Sep 2020 04:48), Max: 37 (23 Sep 2020 09:46)  T(F): 97.7 (24 Sep 2020 04:48), Max: 98.6 (23 Sep 2020 09:46)  HR: 73 (24 Sep 2020 04:48) (73 - 90)  BP: 137/85 (24 Sep 2020 04:48) (133/86 - 172/103)  BP(mean): --  RR: 18 (24 Sep 2020 04:48) (18 - 19)  SpO2: 97% (24 Sep 2020 04:48) (97% - 100%)  CAPILLARY BLOOD GLUCOSE        I&O's Summary    23 Sep 2020 07:01  -  24 Sep 2020 07:00  --------------------------------------------------------  IN: 0 mL / OUT: 575 mL / NET: -575 mL    EXTREMITIES:     no   edema/  tpe, osteo      LABS:                        12.7   5.85  )-----------( 176      ( 24 Sep 2020 05:20 )             34.9     09-24    129<L>  |  92<L>  |  10  ----------------------------<  103<H>  3.9   |  23  |  0.79    Ca    9.9      24 Sep 2020 05:20    TPro  7.2  /  Alb  4.4  /  TBili  0.4  /  DBili  x   /  AST  37  /  ALT  27  /  AlkPhos  67  09-23    PT/INR - ( 23 Sep 2020 11:41 )   PT: 11.1 sec;   INR: 0.93 ratio         PTT - ( 23 Sep 2020 11:41 )  PTT:33.3 sec                        Consultant(s) Notes Reviewed:      Care Discussed with Consultants/Other Providers:

## 2020-09-24 NOTE — PROGRESS NOTE ADULT - ASSESSMENT
60M w/ L foot distal digital wound  - Pt seen and evaluated  - Afebrile, WBC 5, ESR/CRP pending  - L foot distal 3rd digit wound, 2.0x1.0cm, depth to bone wound bed fibrogranular, no malodor, periwound erythematous, etiology 2/2 pressure, neuropathy  -cont. IV Abx  - Booked for L foot partial third ray resection w/ Dr. Wolff at 12:30PM Friday 9/25/20  - Cardiac clearance documented since 9/24  - NPO after midnight with pre-op labs to be drawn in AM  - Seen w/ attending

## 2020-09-24 NOTE — CHART NOTE - NSCHARTNOTEFT_GEN_A_CORE
Non-emergent left foot surgery to be cancelled tomorrow 9/25 due to COVID positive status. Recommend discharge on IV abx for 2 weeks per ID, and return to hospital once COVID negative for left foot partial third ray resection.

## 2020-09-24 NOTE — PROGRESS NOTE ADULT - SUBJECTIVE AND OBJECTIVE BOX
Patient is a 60y old  Male who presents with a chief complaint of osteo  toe (24 Sep 2020 08:15)       INTERVAL HPI/OVERNIGHT EVENTS:  Patient seen and evaluated at bedside.  Pt is resting comfortable in NAD. Denies N/V/F/C.    Allergies    penicillin (Unknown)    Intolerances        Vital Signs Last 24 Hrs  T(C): 36.7 (24 Sep 2020 08:46), Max: 37 (23 Sep 2020 16:39)  T(F): 98.1 (24 Sep 2020 08:46), Max: 98.6 (23 Sep 2020 16:39)  HR: 99 (24 Sep 2020 08:46) (73 - 99)  BP: 152/107 (24 Sep 2020 08:46) (137/85 - 172/103)  BP(mean): --  RR: 18 (24 Sep 2020 08:46) (18 - 19)  SpO2: 99% (24 Sep 2020 08:46) (97% - 100%)    LABS:                        12.7   5.85  )-----------( 176      ( 24 Sep 2020 05:20 )             34.9     09-24    129<L>  |  92<L>  |  10  ----------------------------<  103<H>  3.9   |  23  |  0.79    Ca    9.9      24 Sep 2020 05:20    TPro  7.2  /  Alb  4.4  /  TBili  0.4  /  DBili  x   /  AST  37  /  ALT  27  /  AlkPhos  67  09-23    PT/INR - ( 23 Sep 2020 11:41 )   PT: 11.1 sec;   INR: 0.93 ratio         PTT - ( 23 Sep 2020 11:41 )  PTT:33.3 sec    CAPILLARY BLOOD GLUCOSE          Lower Extremity Physical Exam:  Vascular: DP/PT 2/4, B/L, CFT <3 seconds B/L, Temperature gradient warm to cool, B/L.   Neuro: Epicritic sensation absent to the level of digits, B/L.  Musculoskeletal/Ortho: L foot 3rd digit dactylitis  Skin: L foot distal 3rd digit wound, 2.0x1.0cm, depth to bone wound bed fibrogranular, no malodor, periwound erythematous, etiology 2/2 pressure, neuropathy    RADIOLOGY & ADDITIONAL TESTS:  < from: Xray Foot AP + Lateral + Oblique, Left (09.23.20 @ 12:52) >    EXAM:  FOOT COMPLETE LEFT (MIN 3 VIEWS)                            PROCEDURE DATE:  09/23/2020            INTERPRETATION:  History: Left foot third digit wound extending to bone.    3 views of the left foot were performed.    Findings:    There is prominent soft tissue swelling about the third toe. There is cortical erosion and destruction of the tuft of the distal phalanx of the third toe consistent with osteomyelitis. Chronic appearing erosion is seen along the distal articular surface of thefirst proximal phalanx which is nonspecific. Postsurgical deformity of the fourth toe proximal interphalangeal joint is noted.    Impression:    Cortical erosion and destruction of the tuft of the distal talus of the third toe consistent with osteomyelitis.                  ANGELITO BLACK M.D., ATTENDING RADIOLOGIST  This document has been electronically signed. Sep 23 2020  4:52PM    < end of copied text >

## 2020-09-25 ENCOUNTER — TRANSCRIPTION ENCOUNTER (OUTPATIENT)
Age: 60
End: 2020-09-25

## 2020-09-25 LAB
ANION GAP SERPL CALC-SCNC: 14 MMOL/L — SIGNIFICANT CHANGE UP (ref 5–17)
ANION GAP SERPL CALC-SCNC: 15 MMOL/L — SIGNIFICANT CHANGE UP (ref 5–17)
APPEARANCE UR: CLEAR — SIGNIFICANT CHANGE UP
APTT BLD: 31.3 SEC — SIGNIFICANT CHANGE UP (ref 27.5–35.5)
BILIRUB UR-MCNC: NEGATIVE — SIGNIFICANT CHANGE UP
BUN SERPL-MCNC: 15 MG/DL — SIGNIFICANT CHANGE UP (ref 7–23)
BUN SERPL-MCNC: 17 MG/DL — SIGNIFICANT CHANGE UP (ref 7–23)
CALCIUM SERPL-MCNC: 10 MG/DL — SIGNIFICANT CHANGE UP (ref 8.4–10.5)
CALCIUM SERPL-MCNC: 10.2 MG/DL — SIGNIFICANT CHANGE UP (ref 8.4–10.5)
CHLORIDE SERPL-SCNC: 89 MMOL/L — LOW (ref 96–108)
CHLORIDE SERPL-SCNC: 91 MMOL/L — LOW (ref 96–108)
CO2 SERPL-SCNC: 19 MMOL/L — LOW (ref 22–31)
CO2 SERPL-SCNC: 22 MMOL/L — SIGNIFICANT CHANGE UP (ref 22–31)
COLOR SPEC: SIGNIFICANT CHANGE UP
CREAT ?TM UR-MCNC: 70 MG/DL — SIGNIFICANT CHANGE UP
CREAT SERPL-MCNC: 0.86 MG/DL — SIGNIFICANT CHANGE UP (ref 0.5–1.3)
CREAT SERPL-MCNC: 0.89 MG/DL — SIGNIFICANT CHANGE UP (ref 0.5–1.3)
DIFF PNL FLD: NEGATIVE — SIGNIFICANT CHANGE UP
GLUCOSE SERPL-MCNC: 103 MG/DL — HIGH (ref 70–99)
GLUCOSE SERPL-MCNC: 106 MG/DL — HIGH (ref 70–99)
GLUCOSE UR QL: NEGATIVE — SIGNIFICANT CHANGE UP
HCT VFR BLD CALC: 36 % — LOW (ref 39–50)
HGB BLD-MCNC: 12.5 G/DL — LOW (ref 13–17)
INR BLD: 0.99 RATIO — SIGNIFICANT CHANGE UP (ref 0.88–1.16)
KETONES UR-MCNC: NEGATIVE — SIGNIFICANT CHANGE UP
LEUKOCYTE ESTERASE UR-ACNC: NEGATIVE — SIGNIFICANT CHANGE UP
MCHC RBC-ENTMCNC: 32.5 PG — SIGNIFICANT CHANGE UP (ref 27–34)
MCHC RBC-ENTMCNC: 34.7 GM/DL — SIGNIFICANT CHANGE UP (ref 32–36)
MCV RBC AUTO: 93.5 FL — SIGNIFICANT CHANGE UP (ref 80–100)
NITRITE UR-MCNC: NEGATIVE — SIGNIFICANT CHANGE UP
NRBC # BLD: 0 /100 WBCS — SIGNIFICANT CHANGE UP (ref 0–0)
OSMOLALITY UR: 288 MOSM/KG — SIGNIFICANT CHANGE UP (ref 50–1200)
PH UR: 7 — SIGNIFICANT CHANGE UP (ref 5–8)
PLATELET # BLD AUTO: 167 K/UL — SIGNIFICANT CHANGE UP (ref 150–400)
POTASSIUM SERPL-MCNC: 3.5 MMOL/L — SIGNIFICANT CHANGE UP (ref 3.5–5.3)
POTASSIUM SERPL-MCNC: 3.6 MMOL/L — SIGNIFICANT CHANGE UP (ref 3.5–5.3)
POTASSIUM SERPL-SCNC: 3.5 MMOL/L — SIGNIFICANT CHANGE UP (ref 3.5–5.3)
POTASSIUM SERPL-SCNC: 3.6 MMOL/L — SIGNIFICANT CHANGE UP (ref 3.5–5.3)
PROT UR-MCNC: SIGNIFICANT CHANGE UP
PROTHROM AB SERPL-ACNC: 11.7 SEC — SIGNIFICANT CHANGE UP (ref 10.6–13.6)
RBC # BLD: 3.85 M/UL — LOW (ref 4.2–5.8)
RBC # FLD: 11.9 % — SIGNIFICANT CHANGE UP (ref 10.3–14.5)
SODIUM SERPL-SCNC: 123 MMOL/L — LOW (ref 135–145)
SODIUM SERPL-SCNC: 127 MMOL/L — LOW (ref 135–145)
SODIUM UR-SCNC: 55 MMOL/L — SIGNIFICANT CHANGE UP
SP GR SPEC: 1.01 — SIGNIFICANT CHANGE UP (ref 1.01–1.02)
UROBILINOGEN FLD QL: SIGNIFICANT CHANGE UP
WBC # BLD: 6.31 K/UL — SIGNIFICANT CHANGE UP (ref 3.8–10.5)
WBC # FLD AUTO: 6.31 K/UL — SIGNIFICANT CHANGE UP (ref 3.8–10.5)

## 2020-09-25 PROCEDURE — 99223 1ST HOSP IP/OBS HIGH 75: CPT | Mod: GC

## 2020-09-25 RX ORDER — SODIUM CHLORIDE 9 MG/ML
1000 INJECTION INTRAMUSCULAR; INTRAVENOUS; SUBCUTANEOUS
Refills: 0 | Status: DISCONTINUED | OUTPATIENT
Start: 2020-09-25 | End: 2020-09-25

## 2020-09-25 RX ORDER — CEPHALEXIN 500 MG
500 CAPSULE ORAL THREE TIMES A DAY
Refills: 0 | Status: DISCONTINUED | OUTPATIENT
Start: 2020-09-25 | End: 2020-09-27

## 2020-09-25 RX ORDER — SODIUM CHLORIDE 5 G/100ML
1000 INJECTION, SOLUTION INTRAVENOUS
Refills: 0 | Status: DISCONTINUED | OUTPATIENT
Start: 2020-09-25 | End: 2020-09-25

## 2020-09-25 RX ORDER — SODIUM CHLORIDE 5 G/100ML
1000 INJECTION, SOLUTION INTRAVENOUS
Refills: 0 | Status: DISCONTINUED | OUTPATIENT
Start: 2020-09-25 | End: 2020-09-26

## 2020-09-25 RX ADMIN — Medication 500 MILLIGRAM(S): at 14:57

## 2020-09-25 RX ADMIN — SODIUM CHLORIDE 60 MILLILITER(S): 9 INJECTION INTRAMUSCULAR; INTRAVENOUS; SUBCUTANEOUS at 09:46

## 2020-09-25 RX ADMIN — HEPARIN SODIUM 5000 UNIT(S): 5000 INJECTION INTRAVENOUS; SUBCUTANEOUS at 05:11

## 2020-09-25 RX ADMIN — LOSARTAN POTASSIUM 50 MILLIGRAM(S): 100 TABLET, FILM COATED ORAL at 05:11

## 2020-09-25 RX ADMIN — AMLODIPINE BESYLATE 5 MILLIGRAM(S): 2.5 TABLET ORAL at 05:11

## 2020-09-25 RX ADMIN — CEFEPIME 100 MILLIGRAM(S): 1 INJECTION, POWDER, FOR SOLUTION INTRAMUSCULAR; INTRAVENOUS at 05:10

## 2020-09-25 RX ADMIN — SODIUM CHLORIDE 40 MILLILITER(S): 5 INJECTION, SOLUTION INTRAVENOUS at 21:17

## 2020-09-25 RX ADMIN — HEPARIN SODIUM 5000 UNIT(S): 5000 INJECTION INTRAVENOUS; SUBCUTANEOUS at 17:16

## 2020-09-25 RX ADMIN — LOSARTAN POTASSIUM 50 MILLIGRAM(S): 100 TABLET, FILM COATED ORAL at 17:16

## 2020-09-25 RX ADMIN — Medication 500 MILLIGRAM(S): at 21:16

## 2020-09-25 NOTE — DISCHARGE NOTE PROVIDER - CARE PROVIDERS DIRECT ADDRESSES
,DirectAddress_Unknown ,DirectAddress_Unknown,[email or web address]

## 2020-09-25 NOTE — PROGRESS NOTE ADULT - SUBJECTIVE AND OBJECTIVE BOX
Podiatry pager #: [phone] ([place])/ [phone] ([facility])    Patient is a 60y old  Male who presents with a chief complaint of osteo  toe (25 Sep 2020 09:07)       INTERVAL HPI/OVERNIGHT EVENTS:  Patient seen and evaluated at bedside.  Pt is resting comfortable in NAD. Denies N/V/F/C.     Allergies    penicillin (Unknown)    Intolerances        Vital Signs Last 24 Hrs  T(C): 36.9 (25 Sep 2020 08:04), Max: 36.9 (24 Sep 2020 15:48)  T(F): 98.5 (25 Sep 2020 08:04), Max: 98.5 (24 Sep 2020 15:48)  HR: 78 (25 Sep 2020 08:04) (74 - 101)  BP: 147/95 (25 Sep 2020 08:04) (140/92 - 159/98)  BP(mean): --  RR: 16 (25 Sep 2020 08:04) (16 - 18)  SpO2: 98% (25 Sep 2020 08:04) (97% - 98%)    LABS:                        12.5   6.31  )-----------( 167      ( 25 Sep 2020 05:15 )             36.0     09-25    127<L>  |  91<L>  |  15  ----------------------------<  106<H>  3.5   |  22  |  0.89    Ca    10.2      25 Sep 2020 05:15    TPro  7.2  /  Alb  4.4  /  TBili  0.4  /  DBili  x   /  AST  37  /  ALT  27  /  AlkPhos  67  09-23    PT/INR - ( 25 Sep 2020 08:01 )   PT: 11.7 sec;   INR: 0.99 ratio         PTT - ( 25 Sep 2020 08:01 )  PTT:31.3 sec    CAPILLARY BLOOD GLUCOSE          Lower Extremity Physical Exam:  Vascular: DP/PT 2/4, B/L, CFT <3 seconds B/L, Temperature gradient warm to cool, B/L.   Neuro: Epicritic sensation absent to the level of digits, B/L.  Musculoskeletal/Ortho: L foot 3rd digit dactylitis  Skin: L foot distal 3rd digit wound, 2.0 x 1.0cm, depth to bone wound bed fibro-granular, no malodor,  etiology 2/2 pressure, neuropathy

## 2020-09-25 NOTE — PROGRESS NOTE ADULT - SUBJECTIVE AND OBJECTIVE BOX
CARDIOLOGY     PROGRESS  NOTE   ________________________________________________    CHIEF COMPLAINT:Patient is a 60y old  Male who presents with a chief complaint of osteo  toe (24 Sep 2020 12:08)  no complain.  	  REVIEW OF SYSTEMS:  CONSTITUTIONAL: No fever, weight loss, or fatigue  EYES: No eye pain, visual disturbances, or discharge  ENT:  No difficulty hearing, tinnitus, vertigo; No sinus or throat pain  NECK: No pain or stiffness  RESPIRATORY: No cough, wheezing, chills or hemoptysis; No Shortness of Breath  CARDIOVASCULAR: No chest pain, palpitations, passing out, dizziness, or leg swelling  GASTROINTESTINAL: No abdominal or epigastric pain. No nausea, vomiting, or hematemesis; No diarrhea or constipation. No melena or hematochezia.  GENITOURINARY: No dysuria, frequency, hematuria, or incontinence  NEUROLOGICAL: No headaches, memory loss, loss of strength, numbness, or tremors  SKIN: No itching, burning, rashes, or lesions   LYMPH Nodes: No enlarged glands  ENDOCRINE: No heat or cold intolerance; No hair loss  MUSCULOSKELETAL: No joint pain or swelling; No muscle, back, or extremity pain  PSYCHIATRIC: No depression, anxiety, mood swings, or difficulty sleeping  HEME/LYMPH: No easy bruising, or bleeding gums  ALLERGY AND IMMUNOLOGIC: No hives or eczema	    [ ] All others negative	  [ ] Unable to obtain    PHYSICAL EXAM:  T(C): 36.9 (09-25-20 @ 08:04), Max: 36.9 (09-24-20 @ 15:48)  HR: 78 (09-25-20 @ 08:04) (74 - 101)  BP: 147/95 (09-25-20 @ 08:04) (140/92 - 159/98)  RR: 16 (09-25-20 @ 08:04) (16 - 18)  SpO2: 98% (09-25-20 @ 08:04) (97% - 99%)  Wt(kg): --  I&O's Summary    24 Sep 2020 07:01  -  25 Sep 2020 07:00  --------------------------------------------------------  IN: 940 mL / OUT: 300 mL / NET: 640 mL    25 Sep 2020 07:01  -  25 Sep 2020 08:39  --------------------------------------------------------  IN: 0 mL / OUT: 300 mL / NET: -300 mL        Appearance: Normal	  HEENT:   Normal oral mucosa, PERRL, EOMI	  Lymphatic: No lymphadenopathy  Cardiovascular: Normal S1 S2, No JVD, + murmurs, No edema  Respiratory: Lungs clear to auscultation	  Psychiatry: A & O x 3, Mood & affect appropriate  Gastrointestinal:  Soft, Non-tender, + BS	  Skin: No rashes, No ecchymoses, No cyanosis	  Neurologic: Non-focal  Extremities: Normal range of motion, No clubbing, cyanosis or edema  Vascular: Peripheral pulses palpable 2+ bilaterally    MEDICATIONS  (STANDING):  amLODIPine   Tablet 5 milliGRAM(s) Oral daily  cefepime   IVPB 1000 milliGRAM(s) IV Intermittent every 8 hours  heparin   Injectable 5000 Unit(s) SubCutaneous every 12 hours  influenza   Vaccine 0.5 milliLiter(s) IntraMuscular once  losartan 50 milliGRAM(s) Oral two times a day      TELEMETRY: 	    ECG:  	  RADIOLOGY:  OTHER: 	  	  LABS:	 	    CARDIAC MARKERS:                                12.5   6.31  )-----------( 167      ( 25 Sep 2020 05:15 )             36.0     09-25    127<L>  |  91<L>  |  15  ----------------------------<  106<H>  3.5   |  22  |  0.89    Ca    10.2      25 Sep 2020 05:15    TPro  7.2  /  Alb  4.4  /  TBili  0.4  /  DBili  x   /  AST  37  /  ALT  27  /  AlkPhos  67  09-23    proBNP:   Lipid Profile: Cholesterol 168  LDL 85  HDL 72  TG 58    HgA1c:   TSH: Thyroid Stimulating Hormone, Serum: 1.35 uIU/mL (09-24 @ 08:47)    PT/INR - ( 25 Sep 2020 08:01 )   PT: 11.7 sec;   INR: 0.99 ratio         PTT - ( 25 Sep 2020 08:01 )  PTT:31.3 sec    Patient with contiguous focus om of the left 3rd toe distal phalynx and assymptomatic or old covid. Cefepime is adequate coverage for now. No need for vancomycin  Plan:  continue cefepime  await podiatry plans for toe amputation  await covid ab, no indication for specific covid tx      Assessment and plan  ---------------------------    60y Male complaining of foot injury. presenting with left third toe infection diagnosed by MRI to have osteomyelitis a couple days a now presenting for surgery with podiatry dr. sheikh,      no other complaints, afebrile, not currently on antibiotics notes he just ttok ? ab, a week ago.   Denies any pain in the foot, declines medications for pain, no other complaints at this time.   ot with long history htn , last stress test years ago, not very active, but no chest pain.  increase losartan to 50 mg bid for better bp control  lipid  tsh  dvt prophylaxis  pt is clear for foot surgery cardiac wise  awaiting ecg  doubt PVD , pt with excellent pulses   hyponatremia may consider iv hydration  bp is better controlled  continue abx  may add hctz 12.5 mg daily if increase bp

## 2020-09-25 NOTE — DISCHARGE NOTE PROVIDER - CARE PROVIDER_API CALL
VIANNEY Englewood  Internal Medicine  [address] [place] [address]  Phone: [phone]  Fax: [phone]  Follow Up Time:    LOUISE FAITH  Internal Medicine  [address] [place] [address]  Phone: [phone]  Fax: [phone]  Follow Up Time:     Tino Wolff  PODIATRIC MEDICINE AND SURGERY  [address] [facility], [place] [address]  Phone: [phone]  Fax: [phone]  Follow Up Time:

## 2020-09-25 NOTE — DISCHARGE NOTE PROVIDER - PROVIDER TOKENS
PROVIDER:[TOKEN:[[id number]]] PROVIDER:[TOKEN:[[id number]]],PROVIDER:[TOKEN:[[id number]]]

## 2020-09-25 NOTE — PROGRESS NOTE ADULT - ASSESSMENT
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  on iv fluids/  renal e cl    wife aware     mr< from: MR Foot w/wo IV Cont, Left (09.22.20 @ 11:58) >  IMPRESSION:  1. Osteomyelitis of the third distal phalanx with faint edema of the third middle phalanx that may be reactive or reflect additional infection.  2. No osteomyelitis of the second toe.  3. An email was sent to Dr. SHIRLENE TELLO on 9/22/2020 12:48 PM.  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  on iv fluids/  renal  called    wife aware     mr< from: MR Foot w/wo IV Cont, Left (09.22.20 @ 11:58) >  IMPRESSION:  1. Osteomyelitis of the third distal phalanx with faint edema of the third middle phalanx that may be reactive or reflect additional infection.  2. No osteomyelitis of the second toe.  3. An email was sent to Dr. SHIRLENE TELLO on 9/22/2020 12:48 PM.  < end of copied text >

## 2020-09-25 NOTE — PROGRESS NOTE ADULT - SUBJECTIVE AND OBJECTIVE BOX
afberile  REVIEW OF SYSTEMS:  GEN: no fever,    no chills  RESP: no SOB,   no cough  CVS: no chest pain,   no palpitations  GI: no abdominal pain,   no nausea,   no vomiting,   no constipation,   no diarrhea  : no dysuria,   no frequency  NEURO: no headache,   no dizziness  PSYCH: no depression,   not anxious  Derm : no rash    MEDICATIONS  (STANDING):  amLODIPine   Tablet 5 milliGRAM(s) Oral daily  cephalexin 500 milliGRAM(s) Oral three times a day  heparin   Injectable 5000 Unit(s) SubCutaneous every 12 hours  influenza   Vaccine 0.5 milliLiter(s) IntraMuscular once  losartan 50 milliGRAM(s) Oral two times a day    MEDICATIONS  (PRN):      Vital Signs Last 24 Hrs  T(C): 36.9 (25 Sep 2020 08:04), Max: 36.9 (24 Sep 2020 15:48)  T(F): 98.5 (25 Sep 2020 08:04), Max: 98.5 (24 Sep 2020 15:48)  HR: 78 (25 Sep 2020 08:04) (74 - 101)  BP: 147/95 (25 Sep 2020 08:04) (140/92 - 159/98)  BP(mean): --  RR: 16 (25 Sep 2020 08:04) (16 - 18)  SpO2: 98% (25 Sep 2020 08:04) (97% - 98%)  CAPILLARY BLOOD GLUCOSE        I&O's Summary    24 Sep 2020 07:01  -  25 Sep 2020 07:00  --------------------------------------------------------  IN: 940 mL / OUT: 300 mL / NET: 640 mL    25 Sep 2020 07:01  -  25 Sep 2020 09:07  --------------------------------------------------------  IN: 0 mL / OUT: 300 mL / NET: -300 mL        PHYSICAL EXAM:  HEAD:  Atraumatic, Normocephalic  NECK: Supple, No   JVD  CHEST/LUNG:   no     rales,     no,    rhonchi  HEART: Regular rate and rhythm;         murmur  ABDOMEN: Soft, Nontender, ;   EXTREMITIES:      no  edema  NEUROLOGY:  alert    LABS:                        12.5   6.31  )-----------( 167      ( 25 Sep 2020 05:15 )             36.0     09-25    127<L>  |  91<L>  |  15  ----------------------------<  106<H>  3.5   |  22  |  0.89    Ca    10.2      25 Sep 2020 05:15    TPro  7.2  /  Alb  4.4  /  TBili  0.4  /  DBili  x   /  AST  37  /  ALT  27  /  AlkPhos  67  09-23    PT/INR - ( 25 Sep 2020 08:01 )   PT: 11.7 sec;   INR: 0.99 ratio         PTT - ( 25 Sep 2020 08:01 )  PTT:31.3 sec                Thyroid Stimulating Hormone, Serum: 1.35 uIU/mL (09-24 @ 08:47)          Consultant(s) Notes Reviewed:      Care Discussed with Consultants/Other Providers:       afberile  REVIEW OF SYSTEMS:  GEN: no fever,    no chills  RESP: no SOB,   no cough  CVS: no chest pain,   no palpitations  GI: no abdominal pain,   no nausea,   no vomiting,   no constipation,   no diarrhea  : no dysuria,   no frequency  NEURO: no headache,   no dizziness  PSYCH: no depression,   not anxious  Derm : no rash    MEDICATIONS  (STANDING):  amLODIPine   Tablet 5 milliGRAM(s) Oral daily  cephalexin 500 milliGRAM(s) Oral three times a day  heparin   Injectable 5000 Unit(s) SubCutaneous every 12 hours  influenza   Vaccine 0.5 milliLiter(s) IntraMuscular once  losartan 50 milliGRAM(s) Oral two times a day    MEDICATIONS  (PRN):      Vital Signs Last 24 Hrs  T(C): 36.9 (25 Sep 2020 08:04), Max: 36.9 (24 Sep 2020 15:48)  T(F): 98.5 (25 Sep 2020 08:04), Max: 98.5 (24 Sep 2020 15:48)  HR: 78 (25 Sep 2020 08:04) (74 - 101)  BP: 147/95 (25 Sep 2020 08:04) (140/92 - 159/98)  BP(mean): --  RR: 16 (25 Sep 2020 08:04) (16 - 18)  SpO2: 98% (25 Sep 2020 08:04) (97% - 98%)  CAPILLARY BLOOD GLUCOSE        I&O's Summary    24 Sep 2020 07:01  -  25 Sep 2020 07:00  --------------------------------------------------------  IN: 940 mL / OUT: 300 mL / NET: 640 mL    25 Sep 2020 07:01  -  25 Sep 2020 09:07  --------------------------------------------------------  IN: 0 mL / OUT: 300 mL / NET: -300 mL        PHYSICAL EXAM:  HEAD:  Atraumatic, Normocephalic  exam, per  podiatry      LABS:                        12.5   6.31  )-----------( 167      ( 25 Sep 2020 05:15 )             36.0     09-25    127<L>  |  91<L>  |  15  ----------------------------<  106<H>  3.5   |  22  |  0.89    Ca    10.2      25 Sep 2020 05:15    TPro  7.2  /  Alb  4.4  /  TBili  0.4  /  DBili  x   /  AST  37  /  ALT  27  /  AlkPhos  67  09-23    PT/INR - ( 25 Sep 2020 08:01 )   PT: 11.7 sec;   INR: 0.99 ratio         PTT - ( 25 Sep 2020 08:01 )  PTT:31.3 sec                Thyroid Stimulating Hormone, Serum: 1.35 uIU/mL (09-24 @ 08:47)          Consultant(s) Notes Reviewed:      Care Discussed with Consultants/Other Providers:

## 2020-09-25 NOTE — CHART NOTE - NSCHARTNOTEFT_GEN_A_CORE
MEDICINE NP- EPISODIC NOTE    Called by renal for Na 123. Rec to start NaCl 2% at 40cc/hr and check BMP in 5 hrs. D/W 3T [place]  [id number]

## 2020-09-25 NOTE — CONSULT NOTE ADULT - SUBJECTIVE AND OBJECTIVE BOX
[facility] DIVISION OF KIDNEY DISEASES AND HYPERTENSION -- [phone]  -- INITIAL CONSULT NOTE  --------------------------------------------------------------------------------  If any questions, please feel free to contact me  NS pager: [phone], [facility]: [phone]  Tony Negron M.D.  Nephrology Fellow    (After 5 pm or on weekends please page the on-call fellow)  --------------------------------------------------------------------------------    HPI:  60y Male with PMhx of HTN, presented to ED for treatment of Osteomyelitis - Patient with left third toe infection diagnosed by MRI to have osteomyelitis, presented for surgery with podiatry- s/p oral treatment with antibiotics at home. Pt with COVID PCR NP positive - no resp symptoms. Denies chest pain, SOB, nausea/vomiting, increase thirst, pain in foot, fever, chills, no sick contact, no recent travel. No recent NSAID use- At home reports taking losartan/HCTZ for Blood pressure    Nephrology consulted for hyponatremia.       PAST HISTORY  --------------------------------------------------------------------------------  PAST MEDICAL & SURGICAL HISTORY:  HTN (hypertension)    No significant past surgical history      FAMILY HISTORY:    PAST SOCIAL HISTORY:    ALLERGIES & MEDICATIONS  --------------------------------------------------------------------------------  Allergies    penicillin (Unknown)    Intolerances      Standing Inpatient Medications  amLODIPine   Tablet 5 milliGRAM(s) Oral daily  cephalexin 500 milliGRAM(s) Oral three times a day  heparin   Injectable 5000 Unit(s) SubCutaneous every 12 hours  influenza   Vaccine 0.5 milliLiter(s) IntraMuscular once  losartan 50 milliGRAM(s) Oral two times a day  sodium chloride 0.9%. 1000 milliLiter(s) IV Continuous <Continuous>    PRN Inpatient Medications      REVIEW OF SYSTEMS  --------------------------------------------------------------------------------  Gen: No fevers/chills  Skin: No rashes  Head/Eyes/Ears: Normal hearing,   Respiratory: No dyspnea, cough  CV: No chest pain  GI: No abdominal pain, diarrhea  : No dysuria, hematuria  MSK: No  edema  Heme: No easy bruising or bleeding  Psych: No significant depression    All other systems were reviewed and are negative, except as noted.    VITALS/PHYSICAL EXAM  --------------------------------------------------------------------------------  T(C): 36.9 (09-25-20 @ 08:04), Max: 36.9 (09-24-20 @ 15:48)  HR: 78 (09-25-20 @ 08:04) (74 - 101)  BP: 147/95 (09-25-20 @ 08:04) (140/92 - 159/98)  RR: 16 (09-25-20 @ 08:04) (16 - 18)  SpO2: 98% (09-25-20 @ 08:04) (97% - 98%)  Wt(kg): --        09-24-20 @ 07:01  -  09-25-20 @ 07:00  --------------------------------------------------------  IN: 940 mL / OUT: 300 mL / NET: 640 mL    09-25-20 @ 07:01  -  09-25-20 @ 10:57  --------------------------------------------------------  IN: 0 mL / OUT: 300 mL / NET: -300 mL      Physical Exam:  	Gen: NAD, cooperative  	HEENT: MMM,   	Pulm: CTA B/L, no wheezing no rales  	CV: S1S2, no   	Abd: Soft, +BS   	Ext: No LE edema B/L  	Neuro: Awake, A&O x3, no focal deficits   	Skin: Warm and dry              : no tenderness to palpation               Psych: normal affect and mood        LABS/STUDIES  --------------------------------------------------------------------------------              12.5   6.31  >-----------<  167      [09-25-20 @ 05:15]              36.0     127  |  91  |  15  ----------------------------<  106      [09-25-20 @ 05:15]  3.5   |  22  |  0.89        Ca     10.2     [09-25-20 @ 05:15]    TPro  7.2  /  Alb  4.4  /  TBili  0.4  /  DBili  x   /  AST  37  /  ALT  27  /  AlkPhos  67  [09-23-20 @ 11:41]    PT/INR: PT 11.7 , INR 0.99       [09-25-20 @ 08:01]  PTT: 31.3       [09-25-20 @ 08:01]      Creatinine Trend:  SCr 0.89 [09-25 @ 05:15]  SCr 0.79 [09-24 @ 05:20]  SCr 0.89 [09-23 @ 11:41]        TSH 1.35      [09-24-20 @ 08:47]  Lipid: chol 168, TG 58, HDL 72, LDL 85      [09-24-20 @ 09:24]    HCV 0.08, Nonreact      [09-24-20 @ 08:47]

## 2020-09-25 NOTE — DISCHARGE NOTE PROVIDER - NSDCMRMEDTOKEN_GEN_ALL_CORE_FT
Hyzaar 50 mg-12.5 mg oral tablet: 1 tab(s) orally once a day   amLODIPine 5 mg oral tablet: 1 tab(s) orally once a day  cephalexin 500 mg oral capsule: 1 cap(s) orally 3 times a day  Hyzaar 50 mg-12.5 mg oral tablet: 1 tab(s) orally once a day   amLODIPine 5 mg oral tablet: 1 tab(s) orally once a day  cephalexin 500 mg oral capsule: 1 cap(s) orally 3 times a day  furosemide 20 mg oral tablet: 1 tab(s) orally once a day  losartan 50 mg oral tablet: 1 tab(s) orally 2 times a day

## 2020-09-25 NOTE — CONSULT NOTE ADULT - ASSESSMENT
60y Male with PMhx of HTN, presented to ED for treatment of Osteomyelitis - Patient with left third toe infection diagnosed by MRI to have osteomyelitis, presented for surgery with podiatry.     Nephrology consulted for hyponatremia.     #Asymptomatic hyponatremia - Euvolemic   Pt. with hyponatremia most likely due to thiazide side effect, no other identifiable factor contributing with hyponatremia.    no pain, no nausea, no vomiting, no increase thirst   Serum sodium was 130 on admission (9/23) > 129 (9/24)> 127 (9/25),  Denies previous history of hyponatremia. - TSH wnl   Patient also getting IV antibiotics with D5W    plan:  Please Avoid D5W with IV medications   Please check urine sodium, urine Osm, urine Cr   Please discontinue HCTZ at this time - DO NOT restarted once discharge   Fluid restriction to <1L  Monitor serum sodium Q6 hours. Do not correct serum sodium >6-8 meq/day.      #HTN  Avoid HCTZ  c/w losartan- titrate up as needed.   low salt diet 60y Male with PMhx of HTN, presented to ED for treatment of Osteomyelitis - Patient with left third toe infection diagnosed by MRI to have osteomyelitis, presented for surgery with podiatry.     Nephrology consulted for hyponatremia.     #Asymptomatic hyponatremia - Euvolemic   Pt. with hyponatremia most likely due to thiazide side effect.  Patient reports no pain, no nausea, no vomiting, no increase thirst - denies NSAID use  Serum sodium was 130 on admission (9/23) > 129 (9/24)> 127 (9/25),  Denies previous history of hyponatremia. - TSH wnl   Patient also getting IV antibiotics with D5W    plan:  Please Avoid D5W with IV medications   Please check urine sodium, urine Osm, urine Cr   Please discontinue HCTZ at this time - DO NOT restarted once discharge   Fluid restriction to <1L  Monitor serum sodium Q6 hours. Do not correct serum sodium >6-8 meq/day.      #HTN  Avoid HCTZ  c/w losartan- titrate up as needed.   low salt diet

## 2020-09-25 NOTE — DISCHARGE NOTE PROVIDER - NSDCFUADDINST_GEN_ALL_CORE_FT
Podiatry Discharge Instructions:  Follow up: Please follow up within 2 weeks for readmission into the hospital upon clearance of COVID for amputation of your infected toe.  Wound Care: Please apply betadine to your wound and dress with 4x4 gauze and randolph dressing.  Weight bearing: Please weight bear as tolerated in a surgical shoe.  Antibiotics: Please continue as instructed. Activity as tolerated

## 2020-09-25 NOTE — PROGRESS NOTE ADULT - ASSESSMENT
60M w/ L foot distal digital wound  - Pt seen and evaluated  - Afebrile, WBC 6  - L foot distal 3rd digit wound, 2.0x1.0cm, depth to bone wound bed fibro-granular, no malodor, etiology 2/2 pressure, neuropathy  -cont. IV Abx  - L foot partial third ray resection w/ Dr. Wolff Canceled due to positive COVID status  - pod plan for d/c on Abx per ID recs, pt will f/u with Dr. Wolff as outpt and will be readmitted upon negative COVID test and surgery will be rescheduled  - Seen by attending 60M w/ L foot distal digital wound  - Pt seen and evaluated  - Afebrile, WBC 6  - L foot distal 3rd digit wound, 2.0x1.0cm, depth to bone wound bed fibro-granular, no malodor, etiology 2/2 pressure, neuropathy  -cont. IV Abx  - L foot partial third ray resection w/ Dr. Wolff Canceled due to positive COVID status  - pod plan for d/c on Abx per ID recs,   pt informed that due to positive covid status and that he is not septic that I recommend that we wait 2 weeks prior to performing amputtion since foot is stable at this time  If cellulitis or infection progresses aptient instructed to return to hospital immediately for evaluation.   Patient told to return to [facility]  for another covid test and readmission in 2 weeks for possible toe amputation at that time  - Seen by attending 60M w/ L foot distal digital wound  - Pt seen and evaluated  - Afebrile, WBC 6  - L foot distal 3rd digit wound, 2.0x1.0cm, depth to bone wound bed fibro-granular, no malodor, etiology 2/2 pressure, neuropathy  -cont. IV Abx  - L foot partial third ray resection w/ Dr. Wolff Canceled due to positive COVID status  - pod plan for d/c on Abx per ID recs  - pt informed that due to positive covid status and that he is not septic that I recommend that we wait 2 weeks prior to performing amputation since foot is stable at this time  - If cellulitis or infection progresses patient instructed to return to hospital immediately for evaluation.   - Patient told to return to [facility]  for another covid test and possible toe amputation in 2 weeks  - Seen by attending

## 2020-09-25 NOTE — CONSULT NOTE ADULT - ATTENDING COMMENTS
Ayden Owusu  Attending Physician   Division of Infectious Disease  Pager #[phone]  After 5pm/weekend or no response, call #[phone]    I am away on 9/24 and will return 9/25. ID coverage available. Call ID office @ [phone] with questions.
I have seen this patient with the fellow and agree with their assessment and plan. In addition, hypovolemic hyponatremia likely being on thiazide and here for infection/surgery. COVID+ but awaiting ab ( if this is chronic)  Serum sodium was 130 on admission (9/23) > 129 (9/24)> 127 (9/25),  Avoid D5W and change meds to NS base ( abx)  Checking urine sodium, urine Osm, urine Cr   Please discontinue HCTZ at this time - DO NOT restarted once discharge ( can use loop diuretics if needed)- lasix or torsemide  Monitor serum sodium Q6 hours. Do not correct serum sodium >6-8 meq/day.  Ok to use losartan but don't dc back on hyzaar.   dc planning once Na back >130 and meds are updated to alternate for his BP control and edema    Kristina Wilde MD  Cell   Pager   Office

## 2020-09-25 NOTE — DISCHARGE NOTE PROVIDER - NSDCCPCAREPLAN_GEN_ALL_CORE_FT
PRINCIPAL DISCHARGE DIAGNOSIS  Diagnosis: Clinical diagnosis of COVID-19  Assessment and Plan of Treatment: You tested positive for COVID 19.  You no longer require hospitalization.  Please restrict activities outside of your home except for getting medical care for the next 14 days.    Do not go to work, school, or public areas.  Avoid using public transportation, ride-sharing, or taxis.  Separate yourself from other people and animals in your home.  Call ahead before visiting your doctor.  Wear a facemask when you are around other people. Cover your cough and sneezes.  Clean your hands often.  Avoid sharing personal household items.  Clean all frequently touched surfaces daily.      SECONDARY DISCHARGE DIAGNOSES  Diagnosis: Osteomyelitis  Assessment and Plan of Treatment: Continue with antibiotics through 10/4/20.  Your procedure for a left foot 3rd toe surgery with Podiatrist, Dr Wolff, was cancelled due to COVID 19.   It is recommended that you wait 2 weeks before surgery can be done.  If you notice infection progressing, return to [facility] immediately for evaluation.  Follow up with Podiatry in 2 weeks.  Call the number provided in 2 weeks.    Diagnosis: Dry gangrene  Assessment and Plan of Treatment: Condition stable at this time.  Daily wound care to left foot 3rd toe as follow:  After cleansing, pat dry, apply betadine, cover with 4x4 gauze, wrap with randolph. Weight bear on left foot as tolerated.     PRINCIPAL DISCHARGE DIAGNOSIS  Diagnosis: Clinical diagnosis of COVID-19  Assessment and Plan of Treatment: You tested positive for COVID 19.  You no longer require hospitalization.  Please restrict activities outside of your home except for getting medical care for the next 14 days.    Do not go to work, school, or public areas.  Avoid using public transportation, ride-sharing, or taxis.  Separate yourself from other people and animals in your home.  Call ahead before visiting your doctor.  Wear a facemask when you are around other people. Cover your cough and sneezes.  Clean your hands often.  Avoid sharing personal household items.  Clean all frequently touched surfaces daily.      SECONDARY DISCHARGE DIAGNOSES  Diagnosis: Acute hyponatremia  Assessment and Plan of Treatment: You will need repeat blood work to check your sodium level in 1 week. Call your primary doctor to see if this can be arranged.  Hyponatremia means blood sodium level is below the normal range.  Your last sodium level today is 130 (normal range is 135-145).   Your sodium level was as low as 123 during admission.  Signs of hyponatremia includes headache, nausea, vomiting, lethargy, and confusion.  If dehydration is associated with hyponatremia you may have generalized weakness, muscle aches, and cramps.    Diagnosis: Osteomyelitis  Assessment and Plan of Treatment: Continue with antibiotics through 10/4/20.  Your procedure for a left foot 3rd toe surgery with Podiatrist, Dr Wolff, was cancelled due to COVID 19.   It is recommended that you wait 2 weeks before surgery can be done.  If you notice infection progressing, return to [facility] immediately for evaluation.  Follow up with Podiatry in 2 weeks.  Call the number provided in 2 weeks.    Diagnosis: Dry gangrene  Assessment and Plan of Treatment: Condition stable at this time.  Daily wound care to left foot 3rd toe as follow:  After cleansing, pat dry, apply betadine, cover with 4x4 gauze, wrap with randolph. Weight bear on left foot as tolerated.     PRINCIPAL DISCHARGE DIAGNOSIS  Diagnosis: Clinical diagnosis of COVID-19  Assessment and Plan of Treatment: You tested positive for COVID 19.  You no longer require hospitalization.  Please restrict activities outside of your home except for getting medical care for the next 14 days.    Do not go to work, school, or public areas.  Avoid using public transportation, ride-sharing, or taxis.  Separate yourself from other people and animals in your home.  Call ahead before visiting your doctor.  Wear a facemask when you are around other people. Cover your cough and sneezes.  Clean your hands often.  Avoid sharing personal household items.  Clean all frequently touched surfaces daily.      SECONDARY DISCHARGE DIAGNOSES  Diagnosis: Acute hyponatremia  Assessment and Plan of Treatment: You will need repeat blood work to check your sodium level in 1 week. Call your primary doctor to see if this can be arranged.  Hyponatremia means blood sodium level is below the normal range.  Your last sodium level today is 130 (normal range is 135-145).   Your sodium level was as low as 123 during admission.  Signs of hyponatremia includes headache, nausea, vomiting, lethargy, and confusion.  If dehydration is associated with hyponatremia you may have generalized weakness, muscle aches, and cramps.    Diagnosis: Dry gangrene  Assessment and Plan of Treatment: Condition stable at this time.  Daily wound care to left foot 3rd toe as follow:  After cleansing, pat dry, apply betadine, cover with 4x4 gauze, wrap with randolph. Weight bear on left foot as tolerated.    Diagnosis: Osteomyelitis  Assessment and Plan of Treatment: Continue with antibiotics through 10/4/20.  Your procedure for a left foot 3rd toe surgery with Podiatrist, Dr Wolff, was cancelled due to COVID 19.   It is recommended that you wait 2 weeks before surgery can be done.  If you notice infection progressing, return to [facility] immediately for evaluation.  Follow up with Podiatry in 2 weeks.  Call the number provided in 2 weeks.    Diagnosis: HTN (hypertension)  Assessment and Plan of Treatment: Your Hyzaar was stopped due to hyponatremia; you were started on Losarta & Norvasc,  please d/w your primary doctor.  Low salt diet  Activity as tolerated.  Take all medication as prescribed.  Follow up with your medical doctor for routine blood pressure monitoring at your next visit.  Notify your doctor if you have any of the following symptoms:   Dizziness, Lightheadedness, Blurry vision, Headache, Chest pain, Shortness of breath

## 2020-09-26 LAB
ANION GAP SERPL CALC-SCNC: 11 MMOL/L — SIGNIFICANT CHANGE UP (ref 5–17)
ANION GAP SERPL CALC-SCNC: 13 MMOL/L — SIGNIFICANT CHANGE UP (ref 5–17)
ANION GAP SERPL CALC-SCNC: 13 MMOL/L — SIGNIFICANT CHANGE UP (ref 5–17)
BUN SERPL-MCNC: 14 MG/DL — SIGNIFICANT CHANGE UP (ref 7–23)
BUN SERPL-MCNC: 16 MG/DL — SIGNIFICANT CHANGE UP (ref 7–23)
BUN SERPL-MCNC: 18 MG/DL — SIGNIFICANT CHANGE UP (ref 7–23)
CALCIUM SERPL-MCNC: 10 MG/DL — SIGNIFICANT CHANGE UP (ref 8.4–10.5)
CALCIUM SERPL-MCNC: 10 MG/DL — SIGNIFICANT CHANGE UP (ref 8.4–10.5)
CALCIUM SERPL-MCNC: 10.5 MG/DL — SIGNIFICANT CHANGE UP (ref 8.4–10.5)
CHLORIDE SERPL-SCNC: 93 MMOL/L — LOW (ref 96–108)
CO2 SERPL-SCNC: 21 MMOL/L — LOW (ref 22–31)
CO2 SERPL-SCNC: 21 MMOL/L — LOW (ref 22–31)
CO2 SERPL-SCNC: 23 MMOL/L — SIGNIFICANT CHANGE UP (ref 22–31)
CREAT SERPL-MCNC: 0.77 MG/DL — SIGNIFICANT CHANGE UP (ref 0.5–1.3)
CREAT SERPL-MCNC: 0.83 MG/DL — SIGNIFICANT CHANGE UP (ref 0.5–1.3)
CREAT SERPL-MCNC: 0.99 MG/DL — SIGNIFICANT CHANGE UP (ref 0.5–1.3)
GLUCOSE SERPL-MCNC: 102 MG/DL — HIGH (ref 70–99)
GLUCOSE SERPL-MCNC: 102 MG/DL — HIGH (ref 70–99)
GLUCOSE SERPL-MCNC: 110 MG/DL — HIGH (ref 70–99)
HCT VFR BLD CALC: 34.9 % — LOW (ref 39–50)
HGB BLD-MCNC: 12.3 G/DL — LOW (ref 13–17)
MCHC RBC-ENTMCNC: 32.7 PG — SIGNIFICANT CHANGE UP (ref 27–34)
MCHC RBC-ENTMCNC: 35.2 GM/DL — SIGNIFICANT CHANGE UP (ref 32–36)
MCV RBC AUTO: 92.8 FL — SIGNIFICANT CHANGE UP (ref 80–100)
NRBC # BLD: 0 /100 WBCS — SIGNIFICANT CHANGE UP (ref 0–0)
PLATELET # BLD AUTO: 156 K/UL — SIGNIFICANT CHANGE UP (ref 150–400)
POTASSIUM SERPL-MCNC: 3.4 MMOL/L — LOW (ref 3.5–5.3)
POTASSIUM SERPL-MCNC: 3.9 MMOL/L — SIGNIFICANT CHANGE UP (ref 3.5–5.3)
POTASSIUM SERPL-MCNC: 3.9 MMOL/L — SIGNIFICANT CHANGE UP (ref 3.5–5.3)
POTASSIUM SERPL-SCNC: 3.4 MMOL/L — LOW (ref 3.5–5.3)
POTASSIUM SERPL-SCNC: 3.9 MMOL/L — SIGNIFICANT CHANGE UP (ref 3.5–5.3)
POTASSIUM SERPL-SCNC: 3.9 MMOL/L — SIGNIFICANT CHANGE UP (ref 3.5–5.3)
RBC # BLD: 3.76 M/UL — LOW (ref 4.2–5.8)
RBC # FLD: 11.8 % — SIGNIFICANT CHANGE UP (ref 10.3–14.5)
SODIUM SERPL-SCNC: 127 MMOL/L — LOW (ref 135–145)
WBC # BLD: 5.71 K/UL — SIGNIFICANT CHANGE UP (ref 3.8–10.5)
WBC # FLD AUTO: 5.71 K/UL — SIGNIFICANT CHANGE UP (ref 3.8–10.5)

## 2020-09-26 PROCEDURE — 99232 SBSQ HOSP IP/OBS MODERATE 35: CPT | Mod: GC

## 2020-09-26 RX ORDER — SODIUM CHLORIDE 5 G/100ML
1000 INJECTION, SOLUTION INTRAVENOUS
Refills: 0 | Status: DISCONTINUED | OUTPATIENT
Start: 2020-09-26 | End: 2020-09-27

## 2020-09-26 RX ORDER — POTASSIUM CHLORIDE 20 MEQ
40 PACKET (EA) ORAL ONCE
Refills: 0 | Status: COMPLETED | OUTPATIENT
Start: 2020-09-26 | End: 2020-09-26

## 2020-09-26 RX ADMIN — Medication 500 MILLIGRAM(S): at 05:39

## 2020-09-26 RX ADMIN — AMLODIPINE BESYLATE 5 MILLIGRAM(S): 2.5 TABLET ORAL at 05:39

## 2020-09-26 RX ADMIN — Medication 500 MILLIGRAM(S): at 13:03

## 2020-09-26 RX ADMIN — HEPARIN SODIUM 5000 UNIT(S): 5000 INJECTION INTRAVENOUS; SUBCUTANEOUS at 17:13

## 2020-09-26 RX ADMIN — LOSARTAN POTASSIUM 50 MILLIGRAM(S): 100 TABLET, FILM COATED ORAL at 17:12

## 2020-09-26 RX ADMIN — Medication 500 MILLIGRAM(S): at 22:11

## 2020-09-26 RX ADMIN — LOSARTAN POTASSIUM 50 MILLIGRAM(S): 100 TABLET, FILM COATED ORAL at 05:39

## 2020-09-26 RX ADMIN — HEPARIN SODIUM 5000 UNIT(S): 5000 INJECTION INTRAVENOUS; SUBCUTANEOUS at 05:39

## 2020-09-26 RX ADMIN — SODIUM CHLORIDE 40 MILLILITER(S): 5 INJECTION, SOLUTION INTRAVENOUS at 17:12

## 2020-09-26 RX ADMIN — Medication 40 MILLIEQUIVALENT(S): at 12:55

## 2020-09-26 NOTE — PROGRESS NOTE ADULT - SUBJECTIVE AND OBJECTIVE BOX
CARDIOLOGY     PROGRESS  NOTE   ________________________________________________    CHIEF COMPLAINT:Patient is a 60y old  Male who presents with a chief complaint of osteo  toe (25 Sep 2020 10:57)  no complain.  	  REVIEW OF SYSTEMS:  CONSTITUTIONAL: No fever, weight loss, or fatigue  EYES: No eye pain, visual disturbances, or discharge  ENT:  No difficulty hearing, tinnitus, vertigo; No sinus or throat pain  NECK: No pain or stiffness  RESPIRATORY: No cough, wheezing, chills or hemoptysis; No Shortness of Breath  CARDIOVASCULAR: No chest pain, palpitations, passing out, dizziness, or leg swelling  GASTROINTESTINAL: No abdominal or epigastric pain. No nausea, vomiting, or hematemesis; No diarrhea or constipation. No melena or hematochezia.  GENITOURINARY: No dysuria, frequency, hematuria, or incontinence  NEUROLOGICAL: No headaches, memory loss, loss of strength, numbness, or tremors  SKIN: No itching, burning, rashes, or lesions   LYMPH Nodes: No enlarged glands  ENDOCRINE: No heat or cold intolerance; No hair loss  MUSCULOSKELETAL: No joint pain or swelling; No muscle, back, or extremity pain  PSYCHIATRIC: No depression, anxiety, mood swings, or difficulty sleeping  HEME/LYMPH: No easy bruising, or bleeding gums  ALLERGY AND IMMUNOLOGIC: No hives or eczema	    [ ] All others negative	  [x ] Unable to obtain    PHYSICAL EXAM:  T(C): 36.6 (09-26-20 @ 07:40), Max: 36.9 (09-26-20 @ 05:35)  HR: 75 (09-26-20 @ 07:40) (73 - 101)  BP: 143/93 (09-26-20 @ 07:40) (124/88 - 156/96)  RR: 16 (09-26-20 @ 07:40) (16 - 17)  SpO2: 96% (09-26-20 @ 07:40) (96% - 99%)  Wt(kg): --  I&O's Summary    25 Sep 2020 07:01  -  26 Sep 2020 07:00  --------------------------------------------------------  IN: 1220 mL / OUT: 1780 mL / NET: -560 mL      not examined  Appearance: Normal	  HEENT:   Normal oral mucosa, PERRL, EOMI	  Lymphatic: No lymphadenopathy  Cardiovascular: Normal S1 S2, No JVD, No murmurs, No edema  Respiratory: Lungs clear to auscultation	  Psychiatry: A & O x 3, Mood & affect appropriate  Gastrointestinal:  Soft, Non-tender, + BS	  Skin: No rashes, No ecchymoses, No cyanosis	  Neurologic: Non-focal  Extremities: Normal range of motion, No clubbing, cyanosis or edema  Vascular: Peripheral pulses palpable 2+ bilaterally    MEDICATIONS  (STANDING):  amLODIPine   Tablet 5 milliGRAM(s) Oral daily  cephalexin 500 milliGRAM(s) Oral three times a day  heparin   Injectable 5000 Unit(s) SubCutaneous every 12 hours  influenza   Vaccine 0.5 milliLiter(s) IntraMuscular once  losartan 50 milliGRAM(s) Oral two times a day  potassium chloride    Tablet ER 40 milliEquivalent(s) Oral once      TELEMETRY: 	    ECG:  	  RADIOLOGY:  OTHER: 	  	  LABS:	 	    CARDIAC MARKERS:                                12.3   5.71  )-----------( 156      ( 26 Sep 2020 07:38 )             34.9     09-26    127<L>  |  93<L>  |  14  ----------------------------<  102<H>  3.4<L>   |  21<L>  |  0.77    Ca    10.0      26 Sep 2020 07:38      proBNP:   Lipid Profile: Cholesterol 168  LDL 85  HDL 72  TG 58    HgA1c:   TSH: Thyroid Stimulating Hormone, Serum: 1.35 uIU/mL (09-24 @ 08:47)    PT/INR - ( 25 Sep 2020 08:01 )   PT: 11.7 sec;   INR: 0.99 ratio         PTT - ( 25 Sep 2020 08:01 )  PTT:31.3 sec    Patient with contiguous focus om of the left 3rd toe distal phalynx and asymptomatic or old covid. Cefepime is adequate coverage for now. No need for vancomycin  Plan:  continue cefepime  await podiatry plans for toe amputation  await covid ab, no indication for specific covid tx    Assessment and plan  ---------------------------    60y Male complaining of foot injury. presenting with left third toe infection diagnosed by MRI to have osteomyelitis a couple days a now presenting for surgery with podiatry dr. sheikh,      no other complaints, afebrile, not currently on antibiotics notes he just ttok ? ab, a week ago.   Denies any pain in the foot, declines medications for pain, no other complaints at this time.   ot with long history htn , last stress test years ago, not very active, but no chest pain.  increase losartan to 50 mg bid for better bp control  lipid  tsh  dvt prophylaxis  pt is clear for foot surgery cardiac wise  awaiting ecg  doubt PVD , pt with excellent pulses   hyponatremia may consider iv hydration  bp is better controlled  continue abx  hyponatremia  still in airborne isolation

## 2020-09-26 NOTE — PROGRESS NOTE ADULT - SUBJECTIVE AND OBJECTIVE BOX
no complaints    REVIEW OF SYSTEMS:  GEN: no fever,    no chills  RESP: no SOB,   no cough  CVS: no chest pain,   no palpitations  GI: no abdominal pain,   no nausea,   no vomiting,   no constipation,   no diarrhea  : no dysuria,   no frequency  NEURO: no headache,   no dizziness  PSYCH: no depression,   not anxious  Derm : no rash    MEDICATIONS  (STANDING):  amLODIPine   Tablet 5 milliGRAM(s) Oral daily  cephalexin 500 milliGRAM(s) Oral three times a day  heparin   Injectable 5000 Unit(s) SubCutaneous every 12 hours  influenza   Vaccine 0.5 milliLiter(s) IntraMuscular once  losartan 50 milliGRAM(s) Oral two times a day  potassium chloride    Tablet ER 40 milliEquivalent(s) Oral once    MEDICATIONS  (PRN):      Vital Signs Last 24 Hrs  T(C): 36.6 (26 Sep 2020 07:40), Max: 36.9 (26 Sep 2020 05:35)  T(F): 97.9 (26 Sep 2020 07:40), Max: 98.5 (26 Sep 2020 05:35)  HR: 75 (26 Sep 2020 07:40) (73 - 101)  BP: 143/93 (26 Sep 2020 07:40) (124/88 - 156/96)  BP(mean): --  RR: 16 (26 Sep 2020 07:40) (16 - 17)  SpO2: 96% (26 Sep 2020 07:40) (96% - 99%)  CAPILLARY BLOOD GLUCOSE        I&O's Summary    25 Sep 2020 07:01  -  26 Sep 2020 07:00  --------------------------------------------------------  IN: 1220 mL / OUT: 1780 mL / NET: -560 mL        PHYSICAL EXAM:  HEAD:  Atraumatic, Normocephalic  NECK: Supple, No   JVD  CHEST/LUNG:   no     rales,     no,    rhonchi  HEART: Regular rate and rhythm;         murmur  ABDOMEN: Soft, Nontender, ;   EXTREMITIES:   no     edema  NEUROLOGY:  alert    LABS:                        12.3   5.71  )-----------( 156      ( 26 Sep 2020 07:38 )             34.9     09-    127<L>  |  93<L>  |  14  ----------------------------<  102<H>  3.4<L>   |  21<L>  |  0.77    Ca    10.0      26 Sep 2020 07:38      PT/INR - ( 25 Sep 2020 08:01 )   PT: 11.7 sec;   INR: 0.99 ratio         PTT - ( 25 Sep 2020 08:01 )  PTT:31.3 sec      Urinalysis Basic - ( 25 Sep 2020 14:29 )    Color: Light Yellow / Appearance: Clear / S.010 / pH: x  Gluc: x / Ketone: Negative  / Bili: Negative / Urobili: <2 mg/dL   Blood: x / Protein: Trace / Nitrite: Negative   Leuk Esterase: Negative / RBC: x / WBC x   Sq Epi: x / Non Sq Epi: x / Bacteria: x              Thyroid Stimulating Hormone, Serum: 1.35 uIU/mL ( @ 08:47)          Consultant(s) Notes Reviewed:      Care Discussed with Consultants/Other Providers:     no complaints    REVIEW OF SYSTEMS:  GEN: no fever,    no chills  RESP: no SOB,   no cough  CVS: no chest pain,   no palpitations  GI: no abdominal pain,   no nausea,   no vomiting,   no constipation,   no diarrhea  : no dysuria,   no frequency  NEURO: no headache,   no dizziness  PSYCH: no depression,   not anxious  Derm : no rash    MEDICATIONS  (STANDING):  amLODIPine   Tablet 5 milliGRAM(s) Oral daily  cephalexin 500 milliGRAM(s) Oral three times a day  heparin   Injectable 5000 Unit(s) SubCutaneous every 12 hours  influenza   Vaccine 0.5 milliLiter(s) IntraMuscular once  losartan 50 milliGRAM(s) Oral two times a day  potassium chloride    Tablet ER 40 milliEquivalent(s) Oral once    MEDICATIONS  (PRN):      Vital Signs Last 24 Hrs  T(C): 36.6 (26 Sep 2020 07:40), Max: 36.9 (26 Sep 2020 05:35)  T(F): 97.9 (26 Sep 2020 07:40), Max: 98.5 (26 Sep 2020 05:35)  HR: 75 (26 Sep 2020 07:40) (73 - 101)  BP: 143/93 (26 Sep 2020 07:40) (124/88 - 156/96)  BP(mean): --  RR: 16 (26 Sep 2020 07:40) (16 - 17)  SpO2: 96% (26 Sep 2020 07:40) (96% - 99%)  CAPILLARY BLOOD GLUCOSE        I&O's Summary    25 Sep 2020 07:01  -  26 Sep 2020 07:00  --------------------------------------------------------  IN: 1220 mL / OUT: 1780 mL / NET: -560 mL        PHYSICAL EXAM:. pe r renal  LABS:                        12.3   5.71  )-----------( 156      ( 26 Sep 2020 07:38 )             34.9     09-    127<L>  |  93<L>  |  14  ----------------------------<  102<H>  3.4<L>   |  21<L>  |  0.77    Ca    10.0      26 Sep 2020 07:38      PT/INR - ( 25 Sep 2020 08:01 )   PT: 11.7 sec;   INR: 0.99 ratio         PTT - ( 25 Sep 2020 08:01 )  PTT:31.3 sec      Urinalysis Basic - ( 25 Sep 2020 14:29 )    Color: Light Yellow / Appearance: Clear / S.010 / pH: x  Gluc: x / Ketone: Negative  / Bili: Negative / Urobili: <2 mg/dL   Blood: x / Protein: Trace / Nitrite: Negative   Leuk Esterase: Negative / RBC: x / WBC x   Sq Epi: x / Non Sq Epi: x / Bacteria: x              Thyroid Stimulating Hormone, Serum: 1.35 uIU/mL ( @ 08:47)          Consultant(s) Notes Reviewed:      Care Discussed with Consultants/Other Providers:

## 2020-09-26 NOTE — PROGRESS NOTE ADULT - ATTENDING COMMENTS
I have seen this patient with the fellow and agree with their assessment and plan. In addition, hyponatremia that is related to thiazide use   Na up from 123 ro 127  Cont 2%, goal tomorrow to 130  Can dc on low dose lasix or torsemide over thiazide    Kristina Wilde MD  Cell   Pager   Office

## 2020-09-26 NOTE — PROGRESS NOTE ADULT - ASSESSMENT
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  /  renal  called/   s/p  2  per  cent NS ,on 9/25/   sodium is 127/    wife aware/  plan  d/c  when sodium is  130 or  higher. pe r renal     mr< from: MR Foot w/wo IV Cont, Left (09.22.20 @ 11:58) >  IMPRESSION:  1. Osteomyelitis of the third distal phalanx with faint edema of the third middle phalanx that may be reactive or reflect additional infection.  2. No osteomyelitis of the second toe.  3. An email was sent to Dr. SHIRLENE TELLO on 9/22/2020 12:48 PM.  < end of copied text >

## 2020-09-26 NOTE — PROGRESS NOTE ADULT - ASSESSMENT
60y Male with PMhx of HTN, presented to ED for treatment of Osteomyelitis - Patient with left third toe infection diagnosed by MRI to have osteomyelitis, presented for surgery with podiatry.     Nephrology consulted for hyponatremia.     #Asymptomatic hyponatremia - Euvolemic   Pt. with hyponatremia most likely due to thiazide side effect.  Patient reports no pain, no nausea, no vomiting, no increase thirst - denies NSAID use  Serum sodium was 130 on admission (9/23) > 129 (9/24)> 127 (9/25),  Denies previous history of hyponatremia. - TSH wnl   Patient also getting IV antibiotics with D5W    plan:  Please Avoid D5W with IV medications   Please check urine sodium, urine Osm, urine Cr   Please discontinue HCTZ at this time - DO NOT restarted once discharge   Fluid restriction to <1L  Monitor serum sodium Q6 hours. Do not correct serum sodium >6-8 meq/day.      #HTN  Avoid HCTZ  c/w losartan- titrate up as needed.   low salt diet 60M PMHx HTN, osteomyelitis - admitted for osteomyelitis surgery. Nephrology consulted for hyponatremia.     # Asymptomatic hyponatremia likely 2/2 thiazide medication induced and on D5W IV additive in IV antibiotic, ?covid19  On admission sNa was 130 (9/23).  Uosm 288, Robert 85 consistent w/ diuretic induced hypoNa.  Drop to 127 on 9/25 then 123 s/p started 2% hypertonic saline w/ improvement.    plan:  - replete hypokalemia, goal >4 (will help hypoNa)  - monitor BMP q8hr, check uosm every morning  - fluid restriction 1 liter per day, add ensure protein  - discontinue thiazide from home med and list as allergy (hyponatremia)  - avoid hypotonic fluid including D5W in additive IV medications      # Hypertension, d/c hctz d/t hyponatremia  - c/w losartan- titrate up as needed, low salt diet

## 2020-09-26 NOTE — PROGRESS NOTE ADULT - SUBJECTIVE AND OBJECTIVE BOX
[facility] DIVISION OF KIDNEY DISEASES AND HYPERTENSION   -- FOLLOW UP NOTE --   Rosalina Reyes  Nephrology Fellow  Pager [facility]: [phone]   /  Pager [facility]: [phone]  (after 5pm or weekend please page the on-call fellow)  --------------------------------------------------------------------------------  24 hour events/subjective:  - overnight drop sNa 123 started on 2% hypertonic fluid w/ improving sNa, vitals afebrile no hypotensive episode, total UOP 1.7 ilters voided in the past 24hr.  Vitals/lab/medications reviewed, noted for improving sNa 123 to 127 after 2% 30 cc overnight    PAST HISTORY  --------------------------------------------------------------------------------  No significant changes to PMH, PSH, FHx, SHx, unless otherwise noted    ALLERGIES & MEDICATIONS  --------------------------------------------------------------------------------  Allergies    penicillin (Unknown)    Intolerances    Standing Inpatient Medications  amLODIPine   Tablet 5 milliGRAM(s) Oral daily  cephalexin 500 milliGRAM(s) Oral three times a day  heparin   Injectable 5000 Unit(s) SubCutaneous every 12 hours  influenza   Vaccine 0.5 milliLiter(s) IntraMuscular once  losartan 50 milliGRAM(s) Oral two times a day    PRN Inpatient Medications    REVIEW OF SYSTEMS  Deferred due to COVID-19 positivity and limit spread    VITALS/PHYSICAL EXAM  --------------------------------------------------------------------------------  T(C): 36.6 (09-26-20 @ 07:40), Max: 36.9 (09-26-20 @ 05:35)  HR: 95 (09-26-20 @ 12:49) (73 - 95)  BP: 107/78 (09-26-20 @ 12:49) (107/78 - 156/96)  RR: 16 (09-26-20 @ 12:49) (16 - 17)  SpO2: 97% (09-26-20 @ 12:49) (96% - 99%)  Wt(kg): --    09-25-20 @ 07:01  -  09-26-20 @ 07:00  --------------------------------------------------------  IN: 1220 mL / OUT: 1780 mL / NET: -560 mL    09-26-20 @ 07:01  -  09-26-20 @ 13:22  --------------------------------------------------------  IN: 240 mL / OUT: 0 mL / NET: 240 mL    Physical Exam:  Deferred due to COVID-19 positivity    LABS/STUDIES  --------------------------------------------------------------------------------              12.3   5.71  >-----------<  156      [09-26-20 @ 07:38]              34.9     127  |  93  |  18  ----------------------------<  110      [09-26-20 @ 12:49]  3.9   |  21  |  0.99        Ca     10.5     [09-26-20 @ 12:49]      PT/INR: PT 11.7 , INR 0.99       [09-25-20 @ 08:01]  PTT: 31.3       [09-25-20 @ 08:01]      Creatinine Trend:  SCr 0.99 [09-26 @ 12:49]  SCr 0.77 [09-26 @ 07:38]  SCr 0.83 [09-26 @ 02:41]  SCr 0.86 [09-25 @ 18:16]  SCr 0.89 [09-25 @ 05:15]    Urinalysis - [09-25-20 @ 14:29]      Color Light Yellow / Appearance Clear / SG 1.010 / pH 7.0      Gluc Negative / Ketone Negative  / Bili Negative / Urobili <2 mg/dL       Blood Negative / Protein Trace / Leuk Est Negative / Nitrite Negative      RBC  / WBC  / Hyaline  / Gran  / Sq Epi  / Non Sq Epi  / Bacteria     Urine Creatinine 70      [09-25-20 @ 12:30]  Urine Sodium 55      [09-25-20 @ 12:30]  Urine Osmolality 288      [09-25-20 @ 14:29]    TSH 1.35      [09-24-20 @ 08:47]  Lipid: chol 168, TG 58, HDL 72, LDL 85      [09-24-20 @ 09:24]    HCV 0.08, Nonreact      [09-24-20 @ 08:47]

## 2020-09-27 ENCOUNTER — TRANSCRIPTION ENCOUNTER (OUTPATIENT)
Age: 60
End: 2020-09-27

## 2020-09-27 VITALS
OXYGEN SATURATION: 98 % | SYSTOLIC BLOOD PRESSURE: 136 MMHG | TEMPERATURE: 98 F | HEART RATE: 97 BPM | RESPIRATION RATE: 18 BRPM | DIASTOLIC BLOOD PRESSURE: 91 MMHG

## 2020-09-27 DIAGNOSIS — I10 ESSENTIAL (PRIMARY) HYPERTENSION: ICD-10-CM

## 2020-09-27 LAB
ANION GAP SERPL CALC-SCNC: 13 MMOL/L — SIGNIFICANT CHANGE UP (ref 5–17)
ANION GAP SERPL CALC-SCNC: 15 MMOL/L — SIGNIFICANT CHANGE UP (ref 5–17)
BUN SERPL-MCNC: 18 MG/DL — SIGNIFICANT CHANGE UP (ref 7–23)
BUN SERPL-MCNC: 22 MG/DL — SIGNIFICANT CHANGE UP (ref 7–23)
CALCIUM SERPL-MCNC: 10.4 MG/DL — SIGNIFICANT CHANGE UP (ref 8.4–10.5)
CALCIUM SERPL-MCNC: 9.8 MG/DL — SIGNIFICANT CHANGE UP (ref 8.4–10.5)
CHLORIDE SERPL-SCNC: 95 MMOL/L — LOW (ref 96–108)
CHLORIDE SERPL-SCNC: 96 MMOL/L — SIGNIFICANT CHANGE UP (ref 96–108)
CO2 SERPL-SCNC: 20 MMOL/L — LOW (ref 22–31)
CO2 SERPL-SCNC: 22 MMOL/L — SIGNIFICANT CHANGE UP (ref 22–31)
CREAT SERPL-MCNC: 0.91 MG/DL — SIGNIFICANT CHANGE UP (ref 0.5–1.3)
CREAT SERPL-MCNC: 0.98 MG/DL — SIGNIFICANT CHANGE UP (ref 0.5–1.3)
GLUCOSE SERPL-MCNC: 94 MG/DL — SIGNIFICANT CHANGE UP (ref 70–99)
GLUCOSE SERPL-MCNC: 97 MG/DL — SIGNIFICANT CHANGE UP (ref 70–99)
HCT VFR BLD CALC: 35.1 % — LOW (ref 39–50)
HGB BLD-MCNC: 12.5 G/DL — LOW (ref 13–17)
MCHC RBC-ENTMCNC: 33.8 PG — SIGNIFICANT CHANGE UP (ref 27–34)
MCHC RBC-ENTMCNC: 35.6 GM/DL — SIGNIFICANT CHANGE UP (ref 32–36)
MCV RBC AUTO: 94.9 FL — SIGNIFICANT CHANGE UP (ref 80–100)
NRBC # BLD: 0 /100 WBCS — SIGNIFICANT CHANGE UP (ref 0–0)
PLATELET # BLD AUTO: 156 K/UL — SIGNIFICANT CHANGE UP (ref 150–400)
POTASSIUM SERPL-MCNC: 4 MMOL/L — SIGNIFICANT CHANGE UP (ref 3.5–5.3)
POTASSIUM SERPL-MCNC: 4.2 MMOL/L — SIGNIFICANT CHANGE UP (ref 3.5–5.3)
POTASSIUM SERPL-SCNC: 4 MMOL/L — SIGNIFICANT CHANGE UP (ref 3.5–5.3)
POTASSIUM SERPL-SCNC: 4.2 MMOL/L — SIGNIFICANT CHANGE UP (ref 3.5–5.3)
RBC # BLD: 3.7 M/UL — LOW (ref 4.2–5.8)
RBC # FLD: 11.9 % — SIGNIFICANT CHANGE UP (ref 10.3–14.5)
SARS-COV-2 IGG SERPL QL IA: NEGATIVE — SIGNIFICANT CHANGE UP
SARS-COV-2 IGM SERPL IA-ACNC: 0.08 INDEX — SIGNIFICANT CHANGE UP
SODIUM SERPL-SCNC: 130 MMOL/L — LOW (ref 135–145)
SODIUM SERPL-SCNC: 131 MMOL/L — LOW (ref 135–145)
WBC # BLD: 5.95 K/UL — SIGNIFICANT CHANGE UP (ref 3.8–10.5)
WBC # FLD AUTO: 5.95 K/UL — SIGNIFICANT CHANGE UP (ref 3.8–10.5)

## 2020-09-27 PROCEDURE — 84300 ASSAY OF URINE SODIUM: CPT

## 2020-09-27 PROCEDURE — 80053 COMPREHEN METABOLIC PANEL: CPT

## 2020-09-27 PROCEDURE — 87205 SMEAR GRAM STAIN: CPT

## 2020-09-27 PROCEDURE — 99285 EMERGENCY DEPT VISIT HI MDM: CPT

## 2020-09-27 PROCEDURE — 85652 RBC SED RATE AUTOMATED: CPT

## 2020-09-27 PROCEDURE — 86850 RBC ANTIBODY SCREEN: CPT

## 2020-09-27 PROCEDURE — 84443 ASSAY THYROID STIM HORMONE: CPT

## 2020-09-27 PROCEDURE — 85025 COMPLETE CBC W/AUTO DIFF WBC: CPT

## 2020-09-27 PROCEDURE — U0003: CPT

## 2020-09-27 PROCEDURE — 86140 C-REACTIVE PROTEIN: CPT

## 2020-09-27 PROCEDURE — 93005 ELECTROCARDIOGRAM TRACING: CPT

## 2020-09-27 PROCEDURE — 71045 X-RAY EXAM CHEST 1 VIEW: CPT

## 2020-09-27 PROCEDURE — 86803 HEPATITIS C AB TEST: CPT

## 2020-09-27 PROCEDURE — 83935 ASSAY OF URINE OSMOLALITY: CPT

## 2020-09-27 PROCEDURE — 86900 BLOOD TYPING SEROLOGIC ABO: CPT

## 2020-09-27 PROCEDURE — 99232 SBSQ HOSP IP/OBS MODERATE 35: CPT | Mod: GC

## 2020-09-27 PROCEDURE — 80048 BASIC METABOLIC PNL TOTAL CA: CPT

## 2020-09-27 PROCEDURE — 80061 LIPID PANEL: CPT

## 2020-09-27 PROCEDURE — 87070 CULTURE OTHR SPECIMN AEROBIC: CPT

## 2020-09-27 PROCEDURE — 86901 BLOOD TYPING SEROLOGIC RH(D): CPT

## 2020-09-27 PROCEDURE — 87075 CULTR BACTERIA EXCEPT BLOOD: CPT

## 2020-09-27 PROCEDURE — 85027 COMPLETE CBC AUTOMATED: CPT

## 2020-09-27 PROCEDURE — 85610 PROTHROMBIN TIME: CPT

## 2020-09-27 PROCEDURE — 85730 THROMBOPLASTIN TIME PARTIAL: CPT

## 2020-09-27 PROCEDURE — 86769 SARS-COV-2 COVID-19 ANTIBODY: CPT

## 2020-09-27 PROCEDURE — 81003 URINALYSIS AUTO W/O SCOPE: CPT

## 2020-09-27 PROCEDURE — 82570 ASSAY OF URINE CREATININE: CPT

## 2020-09-27 PROCEDURE — 73630 X-RAY EXAM OF FOOT: CPT

## 2020-09-27 RX ORDER — AMLODIPINE BESYLATE 2.5 MG/1
1 TABLET ORAL
Qty: 0 | Refills: 0 | DISCHARGE
Start: 2020-09-27

## 2020-09-27 RX ORDER — LOSARTAN POTASSIUM 100 MG/1
1 TABLET, FILM COATED ORAL
Qty: 0 | Refills: 0 | DISCHARGE
Start: 2020-09-27 | End: 2020-10-26

## 2020-09-27 RX ORDER — LOSARTAN/HYDROCHLOROTHIAZIDE 100MG-25MG
1 TABLET ORAL
Qty: 0 | Refills: 0 | DISCHARGE

## 2020-09-27 RX ORDER — CEPHALEXIN 500 MG
1 CAPSULE ORAL
Qty: 0 | Refills: 0 | DISCHARGE
Start: 2020-09-27

## 2020-09-27 RX ORDER — LOSARTAN POTASSIUM 100 MG/1
1 TABLET, FILM COATED ORAL
Qty: 0 | Refills: 0 | DISCHARGE
Start: 2020-09-27

## 2020-09-27 RX ORDER — FUROSEMIDE 40 MG
1 TABLET ORAL
Qty: 0 | Refills: 0 | DISCHARGE
Start: 2020-09-27

## 2020-09-27 RX ORDER — FUROSEMIDE 40 MG
1 TABLET ORAL
Qty: 30 | Refills: 0
Start: 2020-09-27 | End: 2020-10-26

## 2020-09-27 RX ORDER — AMLODIPINE BESYLATE 2.5 MG/1
1 TABLET ORAL
Qty: 30 | Refills: 0
Start: 2020-09-27 | End: 2020-10-26

## 2020-09-27 RX ORDER — FUROSEMIDE 40 MG
20 TABLET ORAL DAILY
Refills: 0 | Status: DISCONTINUED | OUTPATIENT
Start: 2020-09-27 | End: 2020-09-27

## 2020-09-27 RX ORDER — CEPHALEXIN 500 MG
1 CAPSULE ORAL
Qty: 21 | Refills: 0
Start: 2020-09-27 | End: 2020-10-03

## 2020-09-27 RX ORDER — LOSARTAN POTASSIUM 100 MG/1
1 TABLET, FILM COATED ORAL
Qty: 60 | Refills: 0
Start: 2020-09-27 | End: 2020-10-26

## 2020-09-27 RX ADMIN — AMLODIPINE BESYLATE 5 MILLIGRAM(S): 2.5 TABLET ORAL at 06:22

## 2020-09-27 RX ADMIN — LOSARTAN POTASSIUM 50 MILLIGRAM(S): 100 TABLET, FILM COATED ORAL at 06:22

## 2020-09-27 RX ADMIN — Medication 20 MILLIGRAM(S): at 10:37

## 2020-09-27 RX ADMIN — Medication 500 MILLIGRAM(S): at 06:22

## 2020-09-27 RX ADMIN — HEPARIN SODIUM 5000 UNIT(S): 5000 INJECTION INTRAVENOUS; SUBCUTANEOUS at 06:22

## 2020-09-27 NOTE — PROGRESS NOTE ADULT - ASSESSMENT
60M PMHx HTN, osteomyelitis - admitted for osteomyelitis surgery. Nephrology consulted for hyponatremia.     # Asymptomatic hyponatremia likely 2/2 thiazide medication induced and on D5W IV additive in IV antibiotic, ?covid19  On admission sNa was 130 (9/23).  Uosm 288, Robert 85 consistent w/ diuretic induced hypoNa.  Drop to 127 on 9/25 then 123 s/p started 2% hypertonic saline w/ improvement.    plan:  - from renal standpoint, okay discharge on lasix 40mg PO daily  - discontinue thiazide from home med and list as allergy (hyponatremia)      # Hypertension, d/c hctz d/t hyponatremia  - c/w losartan, low salt diet 60M PMHx HTN, osteomyelitis - admitted for osteomyelitis surgery. Nephrology consulted for hyponatremia.     # Asymptomatic hyponatremia likely 2/2 thiazide medication induced and on D5W IV additive in IV antibiotic, ?covid19  On admission sNa was 130 (9/23).  Uosm 288, Robert 85 consistent w/ diuretic induced hypoNa.  Drop to 127 on 9/25 then 123 s/p started 2% hypertonic saline w/ improvement.    plan:  - from renal standpoint, okay discharge on lasix 20mg PO daily  - discontinue thiazide from home med and list as allergy (hyponatremia)      # Hypertension, d/c hctz d/t hyponatremia  - c/w losartan, low salt diet

## 2020-09-27 NOTE — PROGRESS NOTE ADULT - SUBJECTIVE AND OBJECTIVE BOX
CARDIOLOGY     PROGRESS  NOTE   ________________________________________________    CHIEF COMPLAINT:Patient is a 60y old  Male who presents with a chief complaint of osteo  toe (26 Sep 2020 13:22)  no complain.  	  REVIEW OF SYSTEMS:  CONSTITUTIONAL: No fever, weight loss, or fatigue  EYES: No eye pain, visual disturbances, or discharge  ENT:  No difficulty hearing, tinnitus, vertigo; No sinus or throat pain  NECK: No pain or stiffness  RESPIRATORY: No cough, wheezing, chills or hemoptysis; No Shortness of Breath  CARDIOVASCULAR: No chest pain, palpitations, passing out, dizziness, or leg swelling  GASTROINTESTINAL: No abdominal or epigastric pain. No nausea, vomiting, or hematemesis; No diarrhea or constipation. No melena or hematochezia.  GENITOURINARY: No dysuria, frequency, hematuria, or incontinence  NEUROLOGICAL: No headaches, memory loss, loss of strength, numbness, or tremors  SKIN: No itching, burning, rashes, or lesions   LYMPH Nodes: No enlarged glands  ENDOCRINE: No heat or cold intolerance; No hair loss  MUSCULOSKELETAL: No joint pain or swelling; No muscle, back, or extremity pain  PSYCHIATRIC: No depression, anxiety, mood swings, or difficulty sleeping  HEME/LYMPH: No easy bruising, or bleeding gums  ALLERGY AND IMMUNOLOGIC: No hives or eczema	    [ ] All others negative	  [ ] Unable to obtain    PHYSICAL EXAM:  T(C): 37 (09-27-20 @ 06:28), Max: 37 (09-26-20 @ 15:50)  HR: 81 (09-27-20 @ 06:28) (72 - 99)  BP: 135/84 (09-27-20 @ 06:28) (107/78 - 135/84)  RR: 18 (09-27-20 @ 06:28) (16 - 18)  SpO2: 98% (09-27-20 @ 06:28) (97% - 98%)  Wt(kg): --  I&O's Summary    26 Sep 2020 07:01  -  27 Sep 2020 07:00  --------------------------------------------------------  IN: 480 mL / OUT: 0 mL / NET: 480 mL      not examined  Appearance: Normal	  HEENT:   Normal oral mucosa, PERRL, EOMI	  Lymphatic: No lymphadenopathy  Cardiovascular: Normal S1 S2, No JVD, No murmurs, No edema  Respiratory: Lungs clear to auscultation	  Psychiatry: A & O x 3, Mood & affect appropriate  Gastrointestinal:  Soft, Non-tender, + BS	  Skin: No rashes, No ecchymoses, No cyanosis	  Neurologic: Non-focal  Extremities: Normal range of motion, No clubbing, cyanosis or edema  Vascular: Peripheral pulses palpable 2+ bilaterally    MEDICATIONS  (STANDING):  amLODIPine   Tablet 5 milliGRAM(s) Oral daily  cephalexin 500 milliGRAM(s) Oral three times a day  heparin   Injectable 5000 Unit(s) SubCutaneous every 12 hours  influenza   Vaccine 0.5 milliLiter(s) IntraMuscular once  losartan 50 milliGRAM(s) Oral two times a day  sodium chloride 2% . 1000 milliLiter(s) (40 mL/Hr) IV Continuous <Continuous>      TELEMETRY: 	    ECG:  	  RADIOLOGY:  OTHER: 	  	  LABS:	 	    CARDIAC MARKERS:                                12.5   5.95  )-----------( 156      ( 27 Sep 2020 07:24 )             35.1     09-27    130<L>  |  95<L>  |  18  ----------------------------<  94  4.2   |  22  |  0.91    Ca    10.4      27 Sep 2020 07:24      proBNP:   Lipid Profile: Cholesterol 168  LDL 85  HDL 72  TG 58    HgA1c:   TSH: Thyroid Stimulating Hormone, Serum: 1.35 uIU/mL (09-24 @ 08:47)          Assessment and plan  ---------------------------  60y Male complaining of foot injury. presenting with left third toe infection diagnosed by MRI to have osteomyelitis a couple days a now presenting for surgery with podiatry dr. sheikh,      no other complaints, afebrile, not currently on antibiotics notes he just ttok ? ab, a week ago.   Denies any pain in the foot, declines medications for pain, no other complaints at this time.   ot with long history htn , last stress test years ago, not very active, but no chest pain.  increase losartan to 50 mg bid for better bp control  lipid  tsh  dvt prophylaxis  pt is clear for foot surgery cardiac wise  awaiting ecg  doubt PVD , pt with excellent pulses   hyponatremia may consider iv hydration  bp is better controlled  continue abx  hyponatremia  still in airborne isolation  bp is well controlled

## 2020-09-27 NOTE — PROGRESS NOTE ADULT - PROVIDER SPECIALTY LIST ADULT
Cardiology
Internal Medicine
Nephrology
Nephrology
Podiatry
Podiatry
Infectious Disease
Cardiology

## 2020-09-27 NOTE — PROGRESS NOTE ADULT - ATTENDING COMMENTS
I have seen this patient with the fellow and agree with their assessment and plan. In addition, thiazide induced hyponatremia   No more HCTZ  Can dc on lasix 20mg po qd for diuretic and HTN control  F.u with PMD 1 week for Na and K check    Kristina Wilde MD  Cell   Pager   Office

## 2020-09-27 NOTE — DISCHARGE NOTE NURSING/CASE MANAGEMENT/SOCIAL WORK - PATIENT PORTAL LINK FT
You can access the FollowMyHealth Patient Portal offered by [facility] by registering at the following website: [email or web address]. By joining [employer]’s FollowMyHealth portal, you will also be able to view your health information using other applications (apps) compatible with our system.

## 2020-09-27 NOTE — PROGRESS NOTE ADULT - REASON FOR ADMISSION
osteo  toe

## 2020-09-27 NOTE — PROGRESS NOTE ADULT - SUBJECTIVE AND OBJECTIVE BOX
[facility] DIVISION OF KIDNEY DISEASES AND HYPERTENSION   -- FOLLOW UP NOTE --   Rosalina Reyes  Nephrology Fellow  Pager [facility]: [phone]   /  Pager [facility]: [phone]  (after 5pm or weekend please page the on-call fellow)  --------------------------------------------------------------------------------  24 hour events/subjective:  - overnight continued on hypertonic 2% w/ improve sNa, vitals afebrile no hypotensive episode  - vitals/lab/medications reviewed, noted for improved sNa 127 to 130    PAST HISTORY  --------------------------------------------------------------------------------  No significant changes to PMH, PSH, FHx, SHx, unless otherwise noted    ALLERGIES & MEDICATIONS  --------------------------------------------------------------------------------  Allergies    hydrochlorothiazide (Other)  penicillin (Unknown)    Intolerances    Standing Inpatient Medications  amLODIPine   Tablet 5 milliGRAM(s) Oral daily  cephalexin 500 milliGRAM(s) Oral three times a day  furosemide    Tablet 20 milliGRAM(s) Oral daily  heparin   Injectable 5000 Unit(s) SubCutaneous every 12 hours  influenza   Vaccine 0.5 milliLiter(s) IntraMuscular once  losartan 50 milliGRAM(s) Oral two times a day  sodium chloride 2% . 1000 milliLiter(s) IV Continuous <Continuous>    PRN Inpatient Medications    REVIEW OF SYSTEMS  deferred d/t covid19, decrease risk exposure/spread    VITALS/PHYSICAL EXAM  --------------------------------------------------------------------------------  T(C): 37 (09-27-20 @ 06:28), Max: 37 (09-26-20 @ 15:50)  HR: 81 (09-27-20 @ 06:28) (72 - 99)  BP: 135/84 (09-27-20 @ 06:28) (107/78 - 135/84)  RR: 18 (09-27-20 @ 06:28) (16 - 18)  SpO2: 98% (09-27-20 @ 06:28) (97% - 98%)  Wt(kg): --    09-26-20 @ 07:01  -  09-27-20 @ 07:00  --------------------------------------------------------  IN: 480 mL / OUT: 0 mL / NET: 480 mL    09-27-20 @ 07:01  -  09-27-20 @ 11:15  --------------------------------------------------------  IN: 300 mL / OUT: 0 mL / NET: 300 mL    Physical Exam:  deferred d/t covid19, decrease risk exposure/spread    LABS/STUDIES  --------------------------------------------------------------------------------              12.5   5.95  >-----------<  156      [09-27-20 @ 07:24]              35.1     130  |  95  |  18  ----------------------------<  94      [09-27-20 @ 07:24]  4.2   |  22  |  0.91        Ca     10.4     [09-27-20 @ 07:24]    Creatinine Trend:  SCr 0.91 [09-27 @ 07:24]  SCr 0.98 [09-26 @ 23:44]  SCr 0.99 [09-26 @ 12:49]  SCr 0.77 [09-26 @ 07:38]  SCr 0.83 [09-26 @ 02:41]    Urinalysis - [09-25-20 @ 14:29]      Color Light Yellow / Appearance Clear / SG 1.010 / pH 7.0      Gluc Negative / Ketone Negative  / Bili Negative / Urobili <2 mg/dL       Blood Negative / Protein Trace / Leuk Est Negative / Nitrite Negative      RBC  / WBC  / Hyaline  / Gran  / Sq Epi  / Non Sq Epi  / Bacteria     Urine Creatinine 70      [09-25-20 @ 12:30]  Urine Sodium 55      [09-25-20 @ 12:30]  Urine Osmolality 288      [09-25-20 @ 14:29]    TSH 1.35      [09-24-20 @ 08:47]  Lipid: chol 168, TG 58, HDL 72, LDL 85      [09-24-20 @ 09:24]    HCV 0.08, Nonreact      [09-24-20 @ 08:47]

## 2020-09-27 NOTE — DISCHARGE NOTE NURSING/CASE MANAGEMENT/SOCIAL WORK - NSDCPEWEB_GEN_ALL_CORE
[place] website --- [email or web address] for Tobacco Control website --- [email or web address]

## 2020-09-27 NOTE — PROGRESS NOTE ADULT - ASSESSMENT
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  /  renal  called/   s/p  2  per  cent NS ,on 9/25/   sodium  was  127    wife aware/  plan  is   d/c today, as   sodium is  130   wife will pich him up/  spoke  with np  covid  antibody.  still pending as  of this  am     mr< from: MR Foot w/wo IV Cont, Left (09.22.20 @ 11:58) >  IMPRESSION:  1. Osteomyelitis of the third distal phalanx with faint edema of the third middle phalanx that may be reactive or reflect additional infection.  2. No osteomyelitis of the second toe.  3. An email was sent to Dr. SHIRLENE TELLO on 9/22/2020 12:48 PM.  < end of copied text >

## 2020-09-27 NOTE — DISCHARGE NOTE NURSING/CASE MANAGEMENT/SOCIAL WORK - NSDCPEEMAIL_GEN_ALL_CORE
[facility] for Tobacco Control email [email or web address]

## 2020-09-27 NOTE — PROGRESS NOTE ADULT - SUBJECTIVE AND OBJECTIVE BOX
no  complaints  REVIEW OF SYSTEMS:  GEN: no fever,    no chills  RESP: no SOB,   no cough  CVS: no chest pain,   no palpitations  GI: no abdominal pain,   no nausea,   no vomiting,   no constipation,   no diarrhea  : no dysuria,   no frequency  NEURO: no headache,   no dizziness  PSYCH: no depression,   not anxious  Derm : no rash    MEDICATIONS  (STANDING):  amLODIPine   Tablet 5 milliGRAM(s) Oral daily  cephalexin 500 milliGRAM(s) Oral three times a day  furosemide    Tablet 20 milliGRAM(s) Oral daily  heparin   Injectable 5000 Unit(s) SubCutaneous every 12 hours  influenza   Vaccine 0.5 milliLiter(s) IntraMuscular once  losartan 50 milliGRAM(s) Oral two times a day  sodium chloride 2% . 1000 milliLiter(s) (40 mL/Hr) IV Continuous <Continuous>    MEDICATIONS  (PRN):      Vital Signs Last 24 Hrs  T(C): 37 (27 Sep 2020 06:28), Max: 37 (26 Sep 2020 15:50)  T(F): 98.6 (27 Sep 2020 06:28), Max: 98.6 (26 Sep 2020 15:50)  HR: 81 (27 Sep 2020 06:28) (72 - 99)  BP: 135/84 (27 Sep 2020 06:28) (107/78 - 135/84)  BP(mean): --  RR: 18 (27 Sep 2020 06:28) (16 - 18)  SpO2: 98% (27 Sep 2020 06:28) (97% - 98%)  CAPILLARY BLOOD GLUCOSE        I&O's Summary    26 Sep 2020 07  -  27 Sep 2020 07:00  --------------------------------------------------------  IN: 480 mL / OUT: 0 mL / NET: 480 mL    27 Sep 2020 07:  -  27 Sep 2020 09:56  --------------------------------------------------------  IN: 240 mL / OUT: 0 mL / NET: 240 mL        PHYSICAL EXAM:  HEAD:  Atraumatic, Normocephalic  NECK: Supple, No   JVD  CHEST/LUNG:   no     rales,     no,    rhonchi  HEART: Regular rate and rhythm;         murmur  ABDOMEN: Soft, Nontender, ;   EXTREMITIES:    no    edema  NEUROLOGY:  alert    LABS:                        12.5   5.95  )-----------( 156      ( 27 Sep 2020 07:24 )             35.1         130<L>  |  95<L>  |  18  ----------------------------<  94  4.2   |  22  |  0.91    Ca    10.4      27 Sep 2020 07:24            Urinalysis Basic - ( 25 Sep 2020 14:29 )    Color: Light Yellow / Appearance: Clear / S.010 / pH: x  Gluc: x / Ketone: Negative  / Bili: Negative / Urobili: <2 mg/dL   Blood: x / Protein: Trace / Nitrite: Negative   Leuk Esterase: Negative / RBC: x / WBC x   Sq Epi: x / Non Sq Epi: x / Bacteria: x              Thyroid Stimulating Hormone, Serum: 1.35 uIU/mL ( @ 08:47)          Consultant(s) Notes Reviewed:      Care Discussed with Consultants/Other Providers:       no  complaints  REVIEW OF SYSTEMS:  GEN: no fever,    no chills  RESP: no SOB,   no cough  CVS: no chest pain,   no palpitations  GI: no abdominal pain,   no nausea,   no vomiting,   no constipation,   no diarrhea  : no dysuria,   no frequency  NEURO: no headache,   no dizziness  PSYCH: no depression,   not anxious  Derm : no rash    MEDICATIONS  (STANDING):  amLODIPine   Tablet 5 milliGRAM(s) Oral daily  cephalexin 500 milliGRAM(s) Oral three times a day  furosemide    Tablet 20 milliGRAM(s) Oral daily  heparin   Injectable 5000 Unit(s) SubCutaneous every 12 hours  influenza   Vaccine 0.5 milliLiter(s) IntraMuscular once  losartan 50 milliGRAM(s) Oral two times a day  sodium chloride 2% . 1000 milliLiter(s) (40 mL/Hr) IV Continuous <Continuous>    MEDICATIONS  (PRN):      Vital Signs Last 24 Hrs  T(C): 37 (27 Sep 2020 06:28), Max: 37 (26 Sep 2020 15:50)  T(F): 98.6 (27 Sep 2020 06:28), Max: 98.6 (26 Sep 2020 15:50)  HR: 81 (27 Sep 2020 06:28) (72 - 99)  BP: 135/84 (27 Sep 2020 06:28) (107/78 - 135/84)  BP(mean): --  RR: 18 (27 Sep 2020 06:28) (16 - 18)  SpO2: 98% (27 Sep 2020 06:28) (97% - 98%)  CAPILLARY BLOOD GLUCOSE        I&O's Summary    26 Sep 2020 07  -  27 Sep 2020 07:00  --------------------------------------------------------  IN: 480 mL / OUT: 0 mL / NET: 480 mL    27 Sep 2020 07:  -  27 Sep 2020 09:56  --------------------------------------------------------  IN: 240 mL / OUT: 0 mL / NET: 240 mL        PHYSICAL EXAM :per  card  LABS:                        12.5   5.95  )-----------( 156      ( 27 Sep 2020 07:24 )             35.1         130<L>  |  95<L>  |  18  ----------------------------<  94  4.2   |  22  |  0.91    Ca    10.4      27 Sep 2020 07:24            Urinalysis Basic - ( 25 Sep 2020 14:29 )    Color: Light Yellow / Appearance: Clear / S.010 / pH: x  Gluc: x / Ketone: Negative  / Bili: Negative / Urobili: <2 mg/dL   Blood: x / Protein: Trace / Nitrite: Negative   Leuk Esterase: Negative / RBC: x / WBC x   Sq Epi: x / Non Sq Epi: x / Bacteria: x              Thyroid Stimulating Hormone, Serum: 1.35 uIU/mL ( @ 08:47)          Consultant(s) Notes Reviewed:      Care Discussed with Consultants/Other Providers:

## 2020-09-28 ENCOUNTER — TRANSCRIPTION ENCOUNTER (OUTPATIENT)
Age: 60
End: 2020-09-28

## 2020-09-28 LAB
CULTURE RESULTS: SIGNIFICANT CHANGE UP
SPECIMEN SOURCE: SIGNIFICANT CHANGE UP

## 2020-09-29 ENCOUNTER — TRANSCRIPTION ENCOUNTER (OUTPATIENT)
Age: 60
End: 2020-09-29

## 2020-10-02 ENCOUNTER — TRANSCRIPTION ENCOUNTER (OUTPATIENT)
Age: 60
End: 2020-10-02

## 2020-10-05 ENCOUNTER — TRANSCRIPTION ENCOUNTER (OUTPATIENT)
Age: 60
End: 2020-10-05

## 2020-10-07 ENCOUNTER — TRANSCRIPTION ENCOUNTER (OUTPATIENT)
Age: 60
End: 2020-10-07

## 2020-10-09 ENCOUNTER — INPATIENT (INPATIENT)
Facility: HOSPITAL | Age: 60
LOS: 3 days | Discharge: HOME | DRG: 505 | End: 2020-10-13
Attending: INTERNAL MEDICINE | Admitting: INTERNAL MEDICINE
Payer: COMMERCIAL

## 2020-10-09 VITALS
OXYGEN SATURATION: 99 % | DIASTOLIC BLOOD PRESSURE: 72 MMHG | RESPIRATION RATE: 16 BRPM | WEIGHT: 227.96 LBS | TEMPERATURE: 99 F | SYSTOLIC BLOOD PRESSURE: 101 MMHG | HEIGHT: 74 IN | HEART RATE: 64 BPM

## 2020-10-09 DIAGNOSIS — M86.9 OSTEOMYELITIS, UNSPECIFIED: ICD-10-CM

## 2020-10-09 PROBLEM — I10 ESSENTIAL (PRIMARY) HYPERTENSION: Chronic | Status: ACTIVE | Noted: 2020-09-23

## 2020-10-09 LAB
ALBUMIN SERPL ELPH-MCNC: 4.2 G/DL — SIGNIFICANT CHANGE UP (ref 3.3–5)
ALP SERPL-CCNC: 67 U/L — SIGNIFICANT CHANGE UP (ref 40–120)
ALT FLD-CCNC: 14 U/L — SIGNIFICANT CHANGE UP (ref 10–45)
ANION GAP SERPL CALC-SCNC: 13 MMOL/L — SIGNIFICANT CHANGE UP (ref 5–17)
APTT BLD: 31 SEC — SIGNIFICANT CHANGE UP (ref 27.5–35.5)
AST SERPL-CCNC: 18 U/L — SIGNIFICANT CHANGE UP (ref 10–40)
BASOPHILS # BLD AUTO: 0.13 K/UL — SIGNIFICANT CHANGE UP (ref 0–0.2)
BASOPHILS NFR BLD AUTO: 1.3 % — SIGNIFICANT CHANGE UP (ref 0–2)
BILIRUB SERPL-MCNC: 0.2 MG/DL — SIGNIFICANT CHANGE UP (ref 0.2–1.2)
BLD GP AB SCN SERPL QL: NEGATIVE — SIGNIFICANT CHANGE UP
BUN SERPL-MCNC: 15 MG/DL — SIGNIFICANT CHANGE UP (ref 7–23)
CALCIUM SERPL-MCNC: 9.1 MG/DL — SIGNIFICANT CHANGE UP (ref 8.4–10.5)
CHLORIDE SERPL-SCNC: 95 MMOL/L — LOW (ref 96–108)
CO2 SERPL-SCNC: 22 MMOL/L — SIGNIFICANT CHANGE UP (ref 22–31)
CREAT SERPL-MCNC: 0.93 MG/DL — SIGNIFICANT CHANGE UP (ref 0.5–1.3)
CRP SERPL-MCNC: 0.82 MG/DL — HIGH (ref 0–0.4)
EOSINOPHIL # BLD AUTO: 0.15 K/UL — SIGNIFICANT CHANGE UP (ref 0–0.5)
EOSINOPHIL NFR BLD AUTO: 1.5 % — SIGNIFICANT CHANGE UP (ref 0–6)
ERYTHROCYTE [SEDIMENTATION RATE] IN BLOOD: 24 MM/HR — HIGH (ref 0–20)
GAS PNL BLDV: SIGNIFICANT CHANGE UP
GLUCOSE SERPL-MCNC: 86 MG/DL — SIGNIFICANT CHANGE UP (ref 70–99)
HCT VFR BLD CALC: 34.1 % — LOW (ref 39–50)
HGB BLD-MCNC: 12.2 G/DL — LOW (ref 13–17)
IMM GRANULOCYTES NFR BLD AUTO: 0.7 % — SIGNIFICANT CHANGE UP (ref 0–1.5)
INR BLD: 0.91 RATIO — SIGNIFICANT CHANGE UP (ref 0.88–1.16)
LYMPHOCYTES # BLD AUTO: 2.19 K/UL — SIGNIFICANT CHANGE UP (ref 1–3.3)
LYMPHOCYTES # BLD AUTO: 22.6 % — SIGNIFICANT CHANGE UP (ref 13–44)
MCHC RBC-ENTMCNC: 34.1 PG — HIGH (ref 27–34)
MCHC RBC-ENTMCNC: 35.8 GM/DL — SIGNIFICANT CHANGE UP (ref 32–36)
MCV RBC AUTO: 95.3 FL — SIGNIFICANT CHANGE UP (ref 80–100)
MONOCYTES # BLD AUTO: 0.95 K/UL — HIGH (ref 0–0.9)
MONOCYTES NFR BLD AUTO: 9.8 % — SIGNIFICANT CHANGE UP (ref 2–14)
NEUTROPHILS # BLD AUTO: 6.19 K/UL — SIGNIFICANT CHANGE UP (ref 1.8–7.4)
NEUTROPHILS NFR BLD AUTO: 64.1 % — SIGNIFICANT CHANGE UP (ref 43–77)
NRBC # BLD: 0 /100 WBCS — SIGNIFICANT CHANGE UP (ref 0–0)
PLATELET # BLD AUTO: 263 K/UL — SIGNIFICANT CHANGE UP (ref 150–400)
POTASSIUM SERPL-MCNC: 4.3 MMOL/L — SIGNIFICANT CHANGE UP (ref 3.5–5.3)
POTASSIUM SERPL-SCNC: 4.3 MMOL/L — SIGNIFICANT CHANGE UP (ref 3.5–5.3)
PROT SERPL-MCNC: 7.1 G/DL — SIGNIFICANT CHANGE UP (ref 6–8.3)
PROTHROM AB SERPL-ACNC: 11 SEC — SIGNIFICANT CHANGE UP (ref 10.6–13.6)
RBC # BLD: 3.58 M/UL — LOW (ref 4.2–5.8)
RBC # FLD: 11.9 % — SIGNIFICANT CHANGE UP (ref 10.3–14.5)
RH IG SCN BLD-IMP: NEGATIVE — SIGNIFICANT CHANGE UP
SARS-COV-2 IGG SERPL IA-ACNC: 1.2 RATIO — HIGH
SARS-COV-2 IGG SERPL QL IA: NEGATIVE — SIGNIFICANT CHANGE UP
SARS-COV-2 IGG SERPL QL IA: POSITIVE
SARS-COV-2 IGM SERPL IA-ACNC: 0.25 RATIO — SIGNIFICANT CHANGE UP
SARS-COV-2 RNA SPEC QL NAA+PROBE: SIGNIFICANT CHANGE UP
SARS-COV-2 RNA SPEC QL NAA+PROBE: SIGNIFICANT CHANGE UP
SODIUM SERPL-SCNC: 130 MMOL/L — LOW (ref 135–145)
WBC # BLD: 9.68 K/UL — SIGNIFICANT CHANGE UP (ref 3.8–10.5)
WBC # FLD AUTO: 9.68 K/UL — SIGNIFICANT CHANGE UP (ref 3.8–10.5)

## 2020-10-09 PROCEDURE — 71045 X-RAY EXAM CHEST 1 VIEW: CPT | Mod: 26

## 2020-10-09 PROCEDURE — 99285 EMERGENCY DEPT VISIT HI MDM: CPT

## 2020-10-09 PROCEDURE — 73630 X-RAY EXAM OF FOOT: CPT | Mod: 26,LT

## 2020-10-09 PROCEDURE — 93010 ELECTROCARDIOGRAM REPORT: CPT

## 2020-10-09 RX ORDER — DEXMEDETOMIDINE HYDROCHLORIDE IN 0.9% SODIUM CHLORIDE 4 UG/ML
0.5 INJECTION INTRAVENOUS
Qty: 200 | Refills: 0 | Status: DISCONTINUED | OUTPATIENT
Start: 2020-10-09 | End: 2020-10-09

## 2020-10-09 RX ORDER — PROPOFOL 10 MG/ML
10 INJECTION, EMULSION INTRAVENOUS
Qty: 1000 | Refills: 0 | Status: DISCONTINUED | OUTPATIENT
Start: 2020-10-09 | End: 2020-10-09

## 2020-10-09 RX ORDER — CEPHALEXIN 500 MG
500 CAPSULE ORAL ONCE
Refills: 0 | Status: COMPLETED | OUTPATIENT
Start: 2020-10-09 | End: 2020-10-09

## 2020-10-09 RX ORDER — AMLODIPINE BESYLATE 2.5 MG/1
5 TABLET ORAL DAILY
Refills: 0 | Status: DISCONTINUED | OUTPATIENT
Start: 2020-10-09 | End: 2020-10-12

## 2020-10-09 RX ORDER — HEPARIN SODIUM 5000 [USP'U]/ML
5000 INJECTION INTRAVENOUS; SUBCUTANEOUS EVERY 12 HOURS
Refills: 0 | Status: DISCONTINUED | OUTPATIENT
Start: 2020-10-09 | End: 2020-10-12

## 2020-10-09 RX ORDER — ASPIRIN/CALCIUM CARB/MAGNESIUM 324 MG
81 TABLET ORAL DAILY
Refills: 0 | Status: DISCONTINUED | OUTPATIENT
Start: 2020-10-09 | End: 2020-10-12

## 2020-10-09 RX ORDER — LOSARTAN POTASSIUM 100 MG/1
50 TABLET, FILM COATED ORAL DAILY
Refills: 0 | Status: DISCONTINUED | OUTPATIENT
Start: 2020-10-09 | End: 2020-10-12

## 2020-10-09 RX ORDER — MILRINONE LACTATE 1 MG/ML
0.38 INJECTION, SOLUTION INTRAVENOUS
Qty: 20 | Refills: 0 | Status: DISCONTINUED | OUTPATIENT
Start: 2020-10-09 | End: 2020-10-09

## 2020-10-09 RX ORDER — FUROSEMIDE 40 MG
20 TABLET ORAL DAILY
Refills: 0 | Status: DISCONTINUED | OUTPATIENT
Start: 2020-10-09 | End: 2020-10-09

## 2020-10-09 RX ORDER — DOBUTAMINE HCL 250MG/20ML
2.5 VIAL (ML) INTRAVENOUS
Qty: 500 | Refills: 0 | Status: DISCONTINUED | OUTPATIENT
Start: 2020-10-09 | End: 2020-10-09

## 2020-10-09 RX ORDER — INFLUENZA VIRUS VACCINE 15; 15; 15; 15 UG/.5ML; UG/.5ML; UG/.5ML; UG/.5ML
0.5 SUSPENSION INTRAMUSCULAR ONCE
Refills: 0 | Status: DISCONTINUED | OUTPATIENT
Start: 2020-10-09 | End: 2020-10-13

## 2020-10-09 RX ORDER — AMLODIPINE BESYLATE 2.5 MG/1
5 TABLET ORAL DAILY
Refills: 0 | Status: DISCONTINUED | OUTPATIENT
Start: 2020-10-09 | End: 2020-10-09

## 2020-10-09 RX ORDER — LOSARTAN POTASSIUM 100 MG/1
50 TABLET, FILM COATED ORAL DAILY
Refills: 0 | Status: DISCONTINUED | OUTPATIENT
Start: 2020-10-09 | End: 2020-10-09

## 2020-10-09 RX ADMIN — Medication 500 MILLIGRAM(S): at 11:40

## 2020-10-09 NOTE — ED PROVIDER NOTE - ADMIT DISPOSITION PRESENT ON ADMISSION SEPSIS
Currently is not taking any medications.  Denies known Latex allergy or symptoms of Latex sensitivity.    Pt presents today regarding scratch on her right wrist from cat yesterday  Pt states that her right wrist is painful   No

## 2020-10-09 NOTE — ED ADULT NURSE REASSESSMENT NOTE - NS ED NURSE REASSESS COMMENT FT1
Updated pt that he has a bed on [address]. Pt resting comfortably without complaints & does not appear to be in any acute distress at this time with VSS. Will continue to reassess.

## 2020-10-09 NOTE — ED PROVIDER NOTE - EKG ADDITIONAL QUESTION - PERFORMED INDEPENDENT VISUALIZATION
Mom calls back to give verbal permission for us to call grandma to speak about patient. Call returned. Grandma states she is extremely congested, green nasal drainage, and a cough. Cough began a few days ago, patient is breathing out of her mouth, no wheezing or labored breathing. Last temp was 99.9 last night, grandma cannot find thermometer this AM. No tugging at ears per grandma. Appetite is good, drinking fluids well. Grandma does not want phone triage, requests appt, scheduled with PCP for today.     Protocol Used: Sinus Pain or Congestion  Protocol-Based Disposition: See Within 3 Days in Office    Positive Triage Question:  * Thick yellow or green pus draining from the nose that's not relieved by nasal washes (Exception: yellow or green tinged secretions are normal)  * All higher-acuity triage questions were negative   Yes

## 2020-10-09 NOTE — ED PROVIDER NOTE - NS ED ROS FT
CONSTITUTIONAL: No fevers, no chills, no lightheadedness, no dizziness  NOSE: no nasal congestion  MOUTH/THROAT: no sore throat  CV: No chest pain   RESP: No SOB, no cough  GI: No n/v/d, no abd pain  : no dysuria, no hematuria  MSK: +mild pain with ambulation to L third toe   SKIN: +ulceration to plantar surface of third toe   NEURO: no focal weakness, no decreased sensation/paresthesias

## 2020-10-09 NOTE — H&P ADULT - ASSESSMENT
presenting with left third toe infection diagnosed by MRI to have osteomyelitis a  weeks days ago, now presenting for surgery with podiatry dr. sheikh,  the surgery was cancelled on the last admission sec to COVID + and was dc d to be back for amputation.  pt was sent by podiatry to be admitted  will get ID clearance for COVID and OSTEO  echo  continue bp meds  dvt prophylaxis  wuill clear pt for surgery  podiatry consult

## 2020-10-09 NOTE — ED ADULT NURSE REASSESSMENT NOTE - NS ED NURSE REASSESS COMMENT FT1
Report received from Amanda KRAMER. Pt A&Ox4 & states "I feel great." Pt admitted for planned surgery Monday. Pt resting comfortably without complaints & does not appear to be in any acute distress at this time with VSS. Will continue to reassess. Report received from Amanda KRAMER. Pt A&Ox4 & states "I feel great." Pt admitted for scheduled Lt 3rd toe surgery on Monday. Lt foot has clean, dry dressing on. Wound noted to Lt 3rd toe with small amount of bloody drainage. Pt resting comfortably without complaints & does not appear to be in any acute distress at this time with VSS. Will continue to reassess.

## 2020-10-09 NOTE — CONSULT NOTE ADULT - ASSESSMENT
61yo M w/ left foot 3rd digit wound to bone   -pt seen and evaluated in ED   - Afebrile, WBC 9.68, ESR 40, CRP 0.41   - Left foot 3rd digit distal wound probing to bone, dactylitis, no malodor, no purulence, no acute SOI   - DSD applied to left foot   - Left foot xray ordered   - Please take new COVID test, previously positive last admission   - Pod plan booked for LF Partial 3rd ray resection on Monday 10/12 at 3pm w/ Dr. Wolff  - Please document medical/cardiac optimization for surgery under local anesthesia w/ light sedation  - Rec admission under medicine   - Rec IV Cefepime   - Discussed w/ attending 61yo M w/ left foot 3rd digit wound to bone   -pt seen and evaluated in ED   - Afebrile, WBC 9.68, ESR/CRP pending   - Left foot 3rd digit distal wound probing to bone, dactylitis, no malodor, no purulence, no acute SOI   - DSD applied to left foot   - Left foot xray ordered   - Please take new COVID test, previously positive last admission   - Pod plan booked for LF Partial 3rd ray resection on Monday 10/12 at 3pm w/ Dr. Wolff  - Please document medical/cardiac optimization for surgery under local anesthesia w/ light sedation  - Rec admission under medicine   - Discussed w/ attending

## 2020-10-09 NOTE — ED PROVIDER NOTE - OBJECTIVE STATEMENT
60y M w/ PMHx HTN and osteomyelitis of L third toe was sent to ED for pre-op labs, repeat COVID testing and admission for scheduled partial amputation of L third toe. Patient states he was told by Dr. Wolff to come to ED for admission. States he tested positive for COVID on 09/23, states he quarantined at home since testing positive, denies fever, chills, cough, sob, n/v, diarrhea, states he otherwise is asymptomatic. Is able to ambulate on L foot with minimal pain, states he is not currently on antibiotics for his osteomyelitis.

## 2020-10-09 NOTE — ED ADULT NURSE NOTE - NS ED NURSE RECORD ANOTHER HT AND WT
Daughter called to say the patient is still in so much and crying. Offered appointment for today and declined as saying she doesn't want to keep bringing the patient to the office as she was just here last week. Nurse advice took the call. Yes

## 2020-10-09 NOTE — ED ADULT NURSE NOTE - CHPI ED NUR SYMPTOMS NEG
no chills/no decreased eating/drinking/no dizziness/no weakness/no nausea/no pain/no vomiting/no tingling/no fever

## 2020-10-09 NOTE — ED ADULT NURSE NOTE - NSIMPLEMENTINTERV_GEN_ALL_ED
Implemented All Universal Safety Interventions:  [place] to call system. Call bell, personal items and telephone within reach. Instruct patient to call for assistance. Room bathroom lighting operational. Non-slip footwear when patient is off stretcher. Physically safe environment: no spills, clutter or unnecessary equipment. Stretcher in lowest position, wheels locked, appropriate side rails in place.

## 2020-10-09 NOTE — CONSULT NOTE ADULT - SUBJECTIVE AND OBJECTIVE BOX
HPI:   Patient is a 60y male with a past history of HTN who is being admitted for elective surgery on left 3rd toe.  He has a history of left 3rd toe ulcer x at least 4 weeks, has received OPD oral antibiotics .He was admitted on 9/23 for elective toe amp when his MRI showed osteo of distal aspect of left 3rd phalanx, surgery aborted due to patient testing positive for the coronavirus.He received a few days of cefepime, his wound culture grew coag negative staph and corynebacteria, he was discharged to complete 1 week of cephalexin at home.  He has been afebrile and feeling well.No respiratory symptoms.His wife and sister who live with him have both tested negative.He was told to come to ER  for planned resection of 3rt toe on 10/12,  He has been off antibiotics for 5 days and has not noted any change in left toe.    REVIEW OF SYSTEMS:  All other review of systems negative (Comprehensive ROS)    PAST MEDICAL & SURGICAL HISTORY:  HTN (hypertension)    No significant past surgical history        Allergies    hydrochlorothiazide (Other)  penicillin (Unknown)    Intolerances        Antimicrobials Day #  :    Other Medications:  amLODIPine   Tablet 5 milliGRAM(s) Oral daily  losartan 50 milliGRAM(s) Oral daily      FAMILY HISTORY:      SOCIAL HISTORY:  Smoking: x    ETOH:  social   Drug Use: x        T(F): 97.6 (10-09-20 @ 11:33), Max: 98.6 (10-09-20 @ 09:23)  HR: 69 (10-09-20 @ 11:33)  BP: 110/73 (10-09-20 @ 11:33)  RR: 18 (10-09-20 @ 11:33)  SpO2: 100% (10-09-20 @ 11:33)  Wt(kg): --    PHYSICAL EXAM:  General: alert, no acute distress  Eyes:  anicteric, no conjunctival injection, no discharge  Oropharynx: no lesions or injection 	  Neck: supple, without adenopathy  Lungs: clear to auscultation  Heart: regular rate and rhythm; no murmur, rubs or gallops  Abdomen: soft, nondistended, nontender, without mass or organomegaly  Skin: no lesions  Extremities: no clubbing, cyanosis, or edema  Neurologic: alert, oriented, moves all extremities  Left 3rd toe is swollen, distal tip with ulcer on plantar surface, no pus, no odor, probes deep  LAB RESULTS:                        12.2   9.68  )-----------( 263      ( 09 Oct 2020 10:32 )             34.1     10-09    130<L>  |  95<L>  |  15  ----------------------------<  86  4.3   |  22  |  0.93    Ca    9.1      09 Oct 2020 10:32    TPro  7.1  /  Alb  4.2  /  TBili  0.2  /  DBili  x   /  AST  18  /  ALT  14  /  AlkPhos  67  10-09    LIVER FUNCTIONS - ( 09 Oct 2020 10:32 )  Alb: 4.2 g/dL / Pro: 7.1 g/dL / ALK PHOS: 67 U/L / ALT: 14 U/L / AST: 18 U/L / GGT: x               MICROBIOLOGY:  RECENT CULTURES:        RADIOLOGY REVIEWED:  < from: MR Foot w/wo IV Cont, Left (09.22.20 @ 11:58) >  IMPRESSION:  1. Osteomyelitis of the third distal phalanx with faint edema of the third middle phalanx that may be reactive or reflect additional infection.  2. No osteomyelitis of the second toe.  3. An email was sent to Dr. SHIRLENE TELLO on 9/22/2020 12:48 PM.    < end of copied text >

## 2020-10-09 NOTE — CONSULT NOTE ADULT - ASSESSMENT
61 yo male with osteo of left 3rd toe-distal tuft.  This is a somewhat chronic process, ulcer presenht for over 1 month.  He is not systemically ill.  He has had a recent Coronavirus infection-appears to be asymptomatic.  He initially tested positive on 9/23, he has been symptom free x over 2 weeks.  No signs of active cellulitis.  Suggest:  1.await repeat coronavirus test, he is most likely no longer infectious and there are no indications for treatment.  2.I am not sure there is a benefit to starting antibiotics at this point, it may be reasonable to limit him to periop coverage  3.If RVP is positive , he will need infection prevention approval for OR

## 2020-10-09 NOTE — H&P ADULT - HISTORY OF PRESENT ILLNESS
CHIEF COMPLAINT:Patient is a 60y old  Male who presents with a chief complaint of     HPI:   presenting with left third toe infection diagnosed by MRI to have osteomyelitis a  weeks days ago, now presenting for surgery with podiatry dr. sheikh,  the surgery was cancelled on the last admission sec to COVID + and was dc d to be back for amputation.    PAST MEDICAL & SURGICAL HISTORY:  HTN (hypertension)    No significant past surgical history        MEDICATIONS  (STANDING):  noted    MEDICATIONS  (PRN):      FAMILY HISTORY:      SOCIAL HISTORY:    [ ] Non-smoker  [ ] Smoker  [ ] Alcohol    Allergies    hydrochlorothiazide (Other)  penicillin (Unknown)    Intolerances    	    REVIEW OF SYSTEMS:  CONSTITUTIONAL: No fever, weight loss, or fatigue  EYES: No eye pain, visual disturbances, or discharge  ENT:  No difficulty hearing, tinnitus, vertigo; No sinus or throat pain  NECK: No pain or stiffness  RESPIRATORY: No cough, wheezing, chills or hemoptysis; No Shortness of Breath  CARDIOVASCULAR: No chest pain, palpitations, passing out, dizziness, or leg swelling  GASTROINTESTINAL: No abdominal or epigastric pain. No nausea, vomiting, or hematemesis; No diarrhea or constipation. No melena or hematochezia.  GENITOURINARY: No dysuria, frequency, hematuria, or incontinence  NEUROLOGICAL: No headaches, memory loss, loss of strength, numbness, or tremors  SKIN: No itching, burning, rashes, or lesions   LYMPH Nodes: No enlarged glands  ENDOCRINE: No heat or cold intolerance; No hair loss  MUSCULOSKELETAL: No joint pain or swelling; No muscle, back, + 3rd toe pain.  PSYCHIATRIC: No depression, anxiety, mood swings, or difficulty sleeping  HEME/LYMPH: No easy bruising, or bleeding gums  ALLERGY AND IMMUNOLOGIC: No hives or eczema	    [ ] All others negative	  [ ] Unable to obtain    PHYSICAL EXAM:  T(C): 36.7 (10-09-20 @ 10:14), Max: 37 (10-09-20 @ 09:23)  HR: 69 (10-09-20 @ 10:14) (64 - 69)  BP: 107/70 (10-09-20 @ 10:14) (101/72 - 107/70)  RR: 18 (10-09-20 @ 10:14) (16 - 18)  SpO2: 100% (10-09-20 @ 10:14) (99% - 100%)  Wt(kg): --  I&O's Summary      Appearance: Normal	  HEENT:   Normal oral mucosa, PERRL, EOMI	  Lymphatic: No lymphadenopathy  Cardiovascular: Normal S1 S2, No JVD, + murmurs, No edema  Respiratory: Lungs clear to auscultation	  Psychiatry: A & O x 3, Mood & affect appropriate  Gastrointestinal:  Soft, Non-tender, + BS	  Skin: No rashes, No ecchymoses, No cyanosis	  Neurologic: Non-focal  Extremities: Normal range of motion, + osteo  3rd l toe  Vascular: Peripheral pulses palpable 2+ bilaterally    TELEMETRY: 	    ECG:  	  RADIOLOGY:  OTHER: 	  	  LABS:	 	    CARDIAC MARKERS:                              12.2   9.68  )-----------( 263      ( 09 Oct 2020 10:32 )             34.1           proBNP:   Lipid Profile:   HgA1c:   TSH:       PREVIOUS DIAGNOSTIC TESTING:      < from: MR Foot w/wo IV Cont, Left (09.22.20 @ 11:58) >  1. Osteomyelitis of the third distal phalanx with faint edema of the third middle phalanx that may be reactive or reflect additional infection.  2. No osteomyelitis of the second toe.  3. An email was sent to Dr. SHIRLENE TELLO on 9/22/2020 12:48 PM.

## 2020-10-09 NOTE — ED PROVIDER NOTE - ATTENDING CONTRIBUTION TO CARE
Attending MD Hardy:  I personally have seen and examined this patient.  Resident note reviewed and agree on plan of care and except where noted.  See HPI, PE, and MDM for details.

## 2020-10-09 NOTE — ED ADULT NURSE REASSESSMENT NOTE - NS ED NURSE REASSESS COMMENT FT1
Pt ambulated to the bathroom & did not require assistance, verbalized understanding that is still awaiting a bed. Pt resting comfortably without complaints & does not appear to be in any acute distress at this time with VSS. Will continue to reassess.

## 2020-10-09 NOTE — ED ADULT NURSE NOTE - OBJECTIVE STATEMENT
Pt is a 60y M PMH HTN, osteomyelitis sent for admission for scheduled 3rd toe L foot amputation 10/12. Pt denies numbness/tingling in extremity, pain, change in appearance recently of toe. Pt able to ambulate. Recently tested COVID + denies any COVID symptoms or contacts. Denies fevers, chills, SOB, chest pain, N/V/D. A&Ox4, LUNA, lungs clear, distal pulses intact, abdomen soft nontender, skin intact. One side rail up for safety, call bell and personal items within reach, instructed to call for assistance, verbalizes understanding. Will continue to monitor.

## 2020-10-09 NOTE — ED PROVIDER NOTE - CLINICAL SUMMARY MEDICAL DECISION MAKING FREE TEXT BOX
Attending MD Hardy: 60M with ho HTN, recent asymptomatic COVID dx 9/23, osteo of left 3rd toe referred to ED by Dr. Wolff of podiatry for evaluation. Exam here with shallow ulcer to distal aspect of 3rd toe, no surrounding erythema, drainage or warmth. Plan for podiatry consultation, admission for planned partial amputation. Will defer abx therapy pending podiatry consultation

## 2020-10-09 NOTE — ED PROVIDER NOTE - PHYSICAL EXAMINATION
Physical Exam:  Gen: NAD, non-toxic appearing, able to ambulate without assistance  Lung: CTAB, no respiratory distress, no wheezes/rhonchi/rales B/L, speaking in full sentences  CV: RRR, no murmurs, rubs or gallops, distal pulses 2+ B/L in LE   MSK: no visible deformities, ROM normal in B/L LE   Neuro: No focal sensory or motor deficits  Skin: +ulceration to plantar surface of distal L third toe, skin warm, well perfused, no rash, no leg swelling  Rosy Galindo D.O. -Resident

## 2020-10-09 NOTE — CONSULT NOTE ADULT - SUBJECTIVE AND OBJECTIVE BOX
Podiatry pager #: [phone] ([place])/ [phone] ([facility])    Patient is a 60y old  Male who presents with a chief complaint of osteomyelitis (09 Oct 2020 10:54)      HPI:  CHIEF COMPLAINT:Patient is a 60y old  Male who presents with a chief complaint of left foot 3rd toe wound    HPI:   presenting with left third toe infection diagnosed by MRI to have osteomyelitis a  weeks days ago, now presenting for surgery with podiatry dr. sheikh,  the surgery was cancelled on the last admission sec to COVID + and was dc d to be back for amputation.    PAST MEDICAL & SURGICAL HISTORY:  HTN (hypertension)    No significant past surgical history        MEDICATIONS  (STANDING):  noted    MEDICATIONS  (PRN):      FAMILY HISTORY:      SOCIAL HISTORY:    [ ] Non-smoker  [ ] Smoker  [ ] Alcohol    Allergies    hydrochlorothiazide (Other)  penicillin (Unknown)    Intolerances    	    REVIEW OF SYSTEMS:  CONSTITUTIONAL: No fever, weight loss, or fatigue  EYES: No eye pain, visual disturbances, or discharge  ENT:  No difficulty hearing, tinnitus, vertigo; No sinus or throat pain  NECK: No pain or stiffness  RESPIRATORY: No cough, wheezing, chills or hemoptysis; No Shortness of Breath  CARDIOVASCULAR: No chest pain, palpitations, passing out, dizziness, or leg swelling  GASTROINTESTINAL: No abdominal or epigastric pain. No nausea, vomiting, or hematemesis; No diarrhea or constipation. No melena or hematochezia.  GENITOURINARY: No dysuria, frequency, hematuria, or incontinence  NEUROLOGICAL: No headaches, memory loss, loss of strength, numbness, or tremors  SKIN: No itching, burning, rashes, or lesions   LYMPH Nodes: No enlarged glands  ENDOCRINE: No heat or cold intolerance; No hair loss  MUSCULOSKELETAL: No joint pain or swelling; No muscle, back, + 3rd toe pain.  PSYCHIATRIC: No depression, anxiety, mood swings, or difficulty sleeping  HEME/LYMPH: No easy bruising, or bleeding gums  ALLERGY AND IMMUNOLOGIC: No hives or eczema	    [ ] All others negative	  [ ] Unable to obtain    PHYSICAL EXAM:  T(C): 36.7 (10-09-20 @ 10:14), Max: 37 (10-09-20 @ 09:23)  HR: 69 (10-09-20 @ 10:14) (64 - 69)  BP: 107/70 (10-09-20 @ 10:14) (101/72 - 107/70)  RR: 18 (10-09-20 @ 10:14) (16 - 18)  SpO2: 100% (10-09-20 @ 10:14) (99% - 100%)  Wt(kg): --  I&O's Summary      Appearance: Normal	  HEENT:   Normal oral mucosa, PERRL, EOMI	  Lymphatic: No lymphadenopathy  Cardiovascular: Normal S1 S2, No JVD, + murmurs, No edema  Respiratory: Lungs clear to auscultation	  Psychiatry: A & O x 3, Mood & affect appropriate  Gastrointestinal:  Soft, Non-tender, + BS	  Skin: No rashes, No ecchymoses, No cyanosis	  Neurologic: Non-focal  Extremities: Normal range of motion, + osteo  3rd l toe  Vascular: Peripheral pulses palpable 2+ bilaterally    TELEMETRY: 	    ECG:  	  RADIOLOGY:  OTHER: 	  	  LABS:	 	    CARDIAC MARKERS:                              12.2   9.68  )-----------( 263      ( 09 Oct 2020 10:32 )             34.1           proBNP:   Lipid Profile:   HgA1c:   TSH:       PREVIOUS DIAGNOSTIC TESTING:      < from: MR Foot w/wo IV Cont, Left (09.22.20 @ 11:58) >  1. Osteomyelitis of the third distal phalanx with faint edema of the third middle phalanx that may be reactive or reflect additional infection.  2. No osteomyelitis of the second toe.  3. An email was sent to Dr. SHIRLENE TELLO on 9/22/2020 12:48 PM.           (09 Oct 2020 10:54)      PAST MEDICAL & SURGICAL HISTORY:  HTN (hypertension)    No significant past surgical history        MEDICATIONS  (STANDING):  amLODIPine   Tablet 5 milliGRAM(s) Oral daily  cephalexin 500 milliGRAM(s) Oral once  losartan 50 milliGRAM(s) Oral daily    MEDICATIONS  (PRN):      Allergies    hydrochlorothiazide (Other)  penicillin (Unknown)    Intolerances        VITALS:    Vital Signs Last 24 Hrs  T(C): 36.7 (09 Oct 2020 10:14), Max: 37 (09 Oct 2020 09:23)  T(F): 98 (09 Oct 2020 10:14), Max: 98.6 (09 Oct 2020 09:23)  HR: 69 (09 Oct 2020 10:14) (64 - 69)  BP: 107/70 (09 Oct 2020 10:14) (101/72 - 107/70)  BP(mean): --  RR: 18 (09 Oct 2020 10:14) (16 - 18)  SpO2: 100% (09 Oct 2020 10:14) (99% - 100%)    LABS:                          12.2   9.68  )-----------( 263      ( 09 Oct 2020 10:32 )             34.1       10-09    130<L>  |  95<L>  |  15  ----------------------------<  86  4.3   |  22  |  0.93    Ca    9.1      09 Oct 2020 10:32    TPro  7.1  /  Alb  4.2  /  TBili  0.2  /  DBili  x   /  AST  18  /  ALT  14  /  AlkPhos  67  10-09      CAPILLARY BLOOD GLUCOSE          PT/INR - ( 09 Oct 2020 10:32 )   PT: 11.0 sec;   INR: 0.91 ratio         PTT - ( 09 Oct 2020 10:32 )  PTT:31.0 sec    LOWER EXTREMITY PHYSICAL EXAM:    Vascular: DP/PT 2/4, B/L, CFT <3 seconds B/L, Temperature gradient warm to cool, B/L.   Neuro: Epicritic sensation absent to the level of digits, B/L.  Musculoskeletal/Ortho: L foot 3rd digit dactylitis  Skin: L foot distal 3rd digit wound, 2.0 x 1.0cm, depth to bone wound bed fibro-granular, no malodor, no purulence, etiology 2/2 pressure, neuropathy    RADIOLOGY & ADDITIONAL STUDIES:

## 2020-10-10 LAB
ALBUMIN SERPL ELPH-MCNC: 3.8 G/DL — SIGNIFICANT CHANGE UP (ref 3.3–5)
ALP SERPL-CCNC: 61 U/L — SIGNIFICANT CHANGE UP (ref 40–120)
ALT FLD-CCNC: 12 U/L — SIGNIFICANT CHANGE UP (ref 10–45)
ANION GAP SERPL CALC-SCNC: 13 MMOL/L — SIGNIFICANT CHANGE UP (ref 5–17)
AST SERPL-CCNC: 16 U/L — SIGNIFICANT CHANGE UP (ref 10–40)
BILIRUB SERPL-MCNC: 0.4 MG/DL — SIGNIFICANT CHANGE UP (ref 0.2–1.2)
BUN SERPL-MCNC: 16 MG/DL — SIGNIFICANT CHANGE UP (ref 7–23)
CALCIUM SERPL-MCNC: 9.5 MG/DL — SIGNIFICANT CHANGE UP (ref 8.4–10.5)
CHLORIDE SERPL-SCNC: 96 MMOL/L — SIGNIFICANT CHANGE UP (ref 96–108)
CO2 SERPL-SCNC: 24 MMOL/L — SIGNIFICANT CHANGE UP (ref 22–31)
CREAT SERPL-MCNC: 0.82 MG/DL — SIGNIFICANT CHANGE UP (ref 0.5–1.3)
GLUCOSE SERPL-MCNC: 86 MG/DL — SIGNIFICANT CHANGE UP (ref 70–99)
HCT VFR BLD CALC: 32.9 % — LOW (ref 39–50)
HGB BLD-MCNC: 11.2 G/DL — LOW (ref 13–17)
MCHC RBC-ENTMCNC: 32.7 PG — SIGNIFICANT CHANGE UP (ref 27–34)
MCHC RBC-ENTMCNC: 34 GM/DL — SIGNIFICANT CHANGE UP (ref 32–36)
MCV RBC AUTO: 96.2 FL — SIGNIFICANT CHANGE UP (ref 80–100)
NRBC # BLD: 0 /100 WBCS — SIGNIFICANT CHANGE UP (ref 0–0)
PLATELET # BLD AUTO: 253 K/UL — SIGNIFICANT CHANGE UP (ref 150–400)
POTASSIUM SERPL-MCNC: 3.9 MMOL/L — SIGNIFICANT CHANGE UP (ref 3.5–5.3)
POTASSIUM SERPL-SCNC: 3.9 MMOL/L — SIGNIFICANT CHANGE UP (ref 3.5–5.3)
PROT SERPL-MCNC: 6.5 G/DL — SIGNIFICANT CHANGE UP (ref 6–8.3)
RBC # BLD: 3.42 M/UL — LOW (ref 4.2–5.8)
RBC # FLD: 12.2 % — SIGNIFICANT CHANGE UP (ref 10.3–14.5)
SODIUM SERPL-SCNC: 133 MMOL/L — LOW (ref 135–145)
WBC # BLD: 7.46 K/UL — SIGNIFICANT CHANGE UP (ref 3.8–10.5)
WBC # FLD AUTO: 7.46 K/UL — SIGNIFICANT CHANGE UP (ref 3.8–10.5)

## 2020-10-10 RX ADMIN — HEPARIN SODIUM 5000 UNIT(S): 5000 INJECTION INTRAVENOUS; SUBCUTANEOUS at 05:20

## 2020-10-10 RX ADMIN — Medication 81 MILLIGRAM(S): at 11:27

## 2020-10-10 RX ADMIN — AMLODIPINE BESYLATE 5 MILLIGRAM(S): 2.5 TABLET ORAL at 05:20

## 2020-10-10 RX ADMIN — LOSARTAN POTASSIUM 50 MILLIGRAM(S): 100 TABLET, FILM COATED ORAL at 05:20

## 2020-10-10 NOTE — PROGRESS NOTE ADULT - ASSESSMENT
61yo M w/ left foot 3rd digit wound to bone   -pt seen and evaluated in ED   - Left foot 3rd digit distal wound probing to bone, dactylitis, no malodor, no purulence, no acute SOI   - DSD applied to left foot   - Pod plan booked for LF Partial 3rd ray resection on Monday 10/12 at 3pm w/ Dr. Wolff  - Please document medical/cardiac optimization for surgery under local anesthesia w/ light sedation

## 2020-10-10 NOTE — PROGRESS NOTE ADULT - SUBJECTIVE AND OBJECTIVE BOX
CC: f/u for Covid and left 3rd toe osteo    Patient reports: no complaints    REVIEW OF SYSTEMS:  All other review of systems negative (Comprehensive ROS)    Antimicrobials Day #  :off    Other Medications Reviewed    T(F): 98.5 (10-10-20 @ 04:26), Max: 98.6 (10-09-20 @ 09:23)  HR: 84 (10-10-20 @ 04:26)  BP: 127/79 (10-10-20 @ 04:26)  RR: 18 (10-10-20 @ 04:26)  SpO2: 97% (10-10-20 @ 04:26)  Wt(kg): --    PHYSICAL EXAM:  General: alert, no acute distress  Eyes:  anicteric, no conjunctival injection, no discharge  Oropharynx: no lesions or injection 	  Neck: supple, without adenopathy  Lungs: clear to auscultation  Heart: regular rate and rhythm; no murmur, rubs or gallops  Abdomen: soft, nondistended, nontender, without mass or organomegaly  Skin: no lesions  Extremities: no clubbing, cyanosis, or edema  Neurologic: alert, oriented, moves all extremities  Left 3rd toe with mild swelling and ulcer on distal tip  LAB RESULTS:                        12.2   9.68  )-----------( 263      ( 09 Oct 2020 10:32 )             34.1     10-09    130<L>  |  95<L>  |  15  ----------------------------<  86  4.3   |  22  |  0.93    Ca    9.1      09 Oct 2020 10:32    TPro  7.1  /  Alb  4.2  /  TBili  0.2  /  DBili  x   /  AST  18  /  ALT  14  /  AlkPhos  67  10-09    LIVER FUNCTIONS - ( 09 Oct 2020 10:32 )  Alb: 4.2 g/dL / Pro: 7.1 g/dL / ALK PHOS: 67 U/L / ALT: 14 U/L / AST: 18 U/L / GGT: x             MICROBIOLOGY:  RECENT CULTURES:  Covid PCR x 2 negative    RADIOLOGY REVIEWED:    < from: MR Foot w/wo IV Cont, Left (09.22.20 @ 11:58) >  IMPRESSION:  1. Osteomyelitis of the third distal phalanx with faint edema of the third middle phalanx that may be reactive or reflect additional infection.  2. No osteomyelitis of the second toe.  3. An email was sent to Dr. SHIRLENE TELLO on 9/22/2020 12:48 PM.    < end of copied text >

## 2020-10-10 NOTE — PROGRESS NOTE ADULT - ASSESSMENT
59 yo male with osteo of left 3rd toe-distal tuft.  This is a somewhat chronic process, ulcer presenht for over 1 month.  He is not systemically ill.  He has had a recent Coronavirus infection-appears to be asymptomatic.  He initially tested positive on 9/23, he has been symptom free x over 2 weeks.  Repeat NP PCR x 2 is negative  No signs of active cellulitis.  Suggest:  1.No need for Covid treatment  2.Stable left foot findings  3.suggest limiting antibiotics to periop prophylaxis with cefazolin

## 2020-10-10 NOTE — PROGRESS NOTE ADULT - SUBJECTIVE AND OBJECTIVE BOX
Patient is a 60y old  Male who presents with a chief complaint of osteomyelitis (10 Oct 2020 08:47)       INTERVAL HPI/OVERNIGHT EVENTS:  Patient seen and evaluated at bedside.  Pt is resting comfortable in NAD. Denies N/V/F/C.      Allergies    hydrochlorothiazide (Other)  penicillin (Unknown)    Intolerances        Vital Signs Last 24 Hrs  T(C): 36.5 (10 Oct 2020 14:18), Max: 36.9 (10 Oct 2020 04:26)  T(F): 97.7 (10 Oct 2020 14:18), Max: 98.5 (10 Oct 2020 04:26)  HR: 96 (10 Oct 2020 14:18) (84 - 96)  BP: 111/75 (10 Oct 2020 14:18) (111/75 - 127/79)  BP(mean): --  RR: 18 (10 Oct 2020 14:18) (18 - 18)  SpO2: 97% (10 Oct 2020 14:18) (97% - 100%)    LABS:                        11.2   7.46  )-----------( 253      ( 10 Oct 2020 07:25 )             32.9     10-10    133<L>  |  96  |  16  ----------------------------<  86  3.9   |  24  |  0.82    Ca    9.5      10 Oct 2020 07:25    TPro  6.5  /  Alb  3.8  /  TBili  0.4  /  DBili  x   /  AST  16  /  ALT  12  /  AlkPhos  61  10-10    PT/INR - ( 09 Oct 2020 10:32 )   PT: 11.0 sec;   INR: 0.91 ratio         PTT - ( 09 Oct 2020 10:32 )  PTT:31.0 sec    CAPILLARY BLOOD GLUCOSE          Lower Extremity Physical Exam:    Vascular: DP/PT 2/4, B/L, CFT <3 seconds B/L, Temperature gradient warm to cool, B/L.   Neuro: Epicritic sensation absent to the level of digits, B/L.  Musculoskeletal/Ortho: L foot 3rd digit dactylitis  Skin: L foot distal 3rd digit wound, 2.0 x 1.0cm, depth to bone wound bed fibro-granular, no malodor, no purulence, etiology 2/2 pressure, neuropathy, right foot calluses on plantar forefoot

## 2020-10-10 NOTE — PROGRESS NOTE ADULT - SUBJECTIVE AND OBJECTIVE BOX
CARDIOLOGY     PROGRESS  NOTE   ________________________________________________    CHIEF COMPLAINT:Patient is a 60y old  Male who presents with a chief complaint of osteomyelitis (10 Oct 2020 06:00)  no complain.  	  REVIEW OF SYSTEMS:  CONSTITUTIONAL: No fever, weight loss, or fatigue  EYES: No eye pain, visual disturbances, or discharge  ENT:  No difficulty hearing, tinnitus, vertigo; No sinus or throat pain  NECK: No pain or stiffness  RESPIRATORY: No cough, wheezing, chills or hemoptysis; No Shortness of Breath  CARDIOVASCULAR: No chest pain, palpitations, passing out, dizziness, or leg swelling  GASTROINTESTINAL: No abdominal or epigastric pain. No nausea, vomiting, or hematemesis; No diarrhea or constipation. No melena or hematochezia.  GENITOURINARY: No dysuria, frequency, hematuria, or incontinence  NEUROLOGICAL: No headaches, memory loss, loss of strength, numbness, or tremors  SKIN: No itching, burning, rashes, or lesions   LYMPH Nodes: No enlarged glands  ENDOCRINE: No heat or cold intolerance; No hair loss  MUSCULOSKELETAL: No joint pain or swelling; No muscle, back, or extremity pain  PSYCHIATRIC: No depression, anxiety, mood swings, or difficulty sleeping  HEME/LYMPH: No easy bruising, or bleeding gums  ALLERGY AND IMMUNOLOGIC: No hives or eczema	    [ ] All others negative	  [ ] Unable to obtain    PHYSICAL EXAM:  T(C): 36.9 (10-10-20 @ 04:26), Max: 37 (10-09-20 @ 09:23)  HR: 84 (10-10-20 @ 04:26) (64 - 90)  BP: 127/79 (10-10-20 @ 04:26) (101/72 - 141/96)  RR: 18 (10-10-20 @ 04:26) (16 - 18)  SpO2: 97% (10-10-20 @ 04:26) (97% - 100%)  Wt(kg): --  I&O's Summary    09 Oct 2020 07:01  -  10 Oct 2020 07:00  --------------------------------------------------------  IN: 480 mL / OUT: 0 mL / NET: 480 mL        Appearance: Normal	  HEENT:   Normal oral mucosa, PERRL, EOMI	  Lymphatic: No lymphadenopathy  Cardiovascular: Normal S1 S2, No JVD, + murmurs, No edema  Respiratory: Lungs clear to auscultation	  Psychiatry: A & O x 3, Mood & affect appropriate  Gastrointestinal:  Soft, Non-tender, + BS	  Skin: No rashes, No ecchymoses, No cyanosis	  Neurologic: Non-focal  Extremities: Normal range of motion, toe osteo  Vascular: ?pvd    MEDICATIONS  (STANDING):  amLODIPine   Tablet 5 milliGRAM(s) Oral daily  aspirin enteric coated 81 milliGRAM(s) Oral daily  heparin   Injectable 5000 Unit(s) SubCutaneous every 12 hours  influenza   Vaccine 0.5 milliLiter(s) IntraMuscular once  losartan 50 milliGRAM(s) Oral daily      TELEMETRY: 	    ECG:  	  RADIOLOGY:  OTHER: 	  	  LABS:	 	    CARDIAC MARKERS:                                11.2   7.46  )-----------( 253      ( 10 Oct 2020 07:25 )             32.9     10-10    133<L>  |  96  |  16  ----------------------------<  86  3.9   |  24  |  0.82    Ca    9.5      10 Oct 2020 07:25    TPro  6.5  /  Alb  3.8  /  TBili  0.4  /  DBili  x   /  AST  16  /  ALT  12  /  AlkPhos  61  10-10    proBNP:   Lipid Profile: Cholesterol 168  LDL 85  HDL 72  TG 58    HgA1c:   TSH: Thyroid Stimulating Hormone, Serum: 1.35 uIU/mL (09-24 @ 08:47)    PT/INR - ( 09 Oct 2020 10:32 )   PT: 11.0 sec;   INR: 0.91 ratio         PTT - ( 09 Oct 2020 10:32 )  PTT:31.0 sec  COVID-19 PCR . (10.09.20 @ 18:01)    COVID-19 PCR: NotDetec: This test has been validated by [employer] to be accurate;  though it has not been FDA cleared/approved by the usual pathway.  As with all laboratory tests, results should be correlated with clinical  findings.  https://www.fda.gov/media/130939/download  https://www.fda.gov/media/775924/download      < from: 12 Lead ECG (10.09.20 @ 10:21) >  Diagnosis Line SINUS RHYTHM WITH 1ST DEGREE A-V BLOCK  OTHERWISE NORMAL ECG  WHEN COMPARED WITH ECG OF 23-SEP-2020 11:45,  NO SIGNIFICANT CHANGE WAS FOUND        Assessment and plan  ---------------------------   presenting with left third toe infection diagnosed by MRI to have osteomyelitis a  weeks days ago, now presenting for surgery with podiatry dr. sheikh,  the surgery was cancelled on the last admission sec to COVID + and was dc d to be back for amputation.  pt was sent by podiatry to be admitted  will get ID clearance for COVID and OSTEO  echo  continue bp meds  dvt prophylaxis  podiatry consult   covid negative  pt is clear cardiac wise for surgery  continue current meds

## 2020-10-11 ENCOUNTER — TRANSCRIPTION ENCOUNTER (OUTPATIENT)
Age: 60
End: 2020-10-11

## 2020-10-11 RX ADMIN — HEPARIN SODIUM 5000 UNIT(S): 5000 INJECTION INTRAVENOUS; SUBCUTANEOUS at 05:33

## 2020-10-11 RX ADMIN — HEPARIN SODIUM 5000 UNIT(S): 5000 INJECTION INTRAVENOUS; SUBCUTANEOUS at 18:16

## 2020-10-11 RX ADMIN — AMLODIPINE BESYLATE 5 MILLIGRAM(S): 2.5 TABLET ORAL at 05:33

## 2020-10-11 RX ADMIN — LOSARTAN POTASSIUM 50 MILLIGRAM(S): 100 TABLET, FILM COATED ORAL at 05:32

## 2020-10-11 RX ADMIN — Medication 81 MILLIGRAM(S): at 14:28

## 2020-10-11 NOTE — CHART NOTE - NSCHARTNOTEFT_GEN_A_CORE
Pt scheduled for L foot partial 3rd ray resection tomorrow 10/12 @ 3:00PM w/ Dr. Wolff  - cards cleared 10/10, medical clearance to be documented  - NPO after midnight

## 2020-10-11 NOTE — PROGRESS NOTE ADULT - SUBJECTIVE AND OBJECTIVE BOX
CC: f/u for Covid and left 3rd toe osteo    Patient reports: no complaints, now off isolation    REVIEW OF SYSTEMS:  All other review of systems negative (Comprehensive ROS)    Antimicrobials Day #  :    Other Medications Reviewed    T(F): 97.9 (10-11-20 @ 04:45), Max: 98.3 (10-10-20 @ 19:41)  HR: 76 (10-11-20 @ 04:45)  BP: 121/79 (10-11-20 @ 04:45)  RR: 18 (10-11-20 @ 04:45)  SpO2: 98% (10-11-20 @ 04:45)  Wt(kg): --    PHYSICAL EXAM:  General: alert, no acute distress  Eyes:  anicteric, no conjunctival injection, no discharge  Oropharynx: no lesions or injection 	  Neck: supple, without adenopathy  Lungs: clear to auscultation  Heart: regular rate and rhythm; no murmur, rubs or gallops  Abdomen: soft, nondistended, nontender, without mass or organomegaly  Skin: no rash  Extremities: no clubbing, cyanosis, or edema  Neurologic: alert, oriented, moves all extremities  Left 3rd toe with deep ulcer on tip, no odor or cellulitis  LAB RESULTS:                        11.2   7.46  )-----------( 253      ( 10 Oct 2020 07:25 )             32.9     10-10    133<L>  |  96  |  16  ----------------------------<  86  3.9   |  24  |  0.82    Ca    9.5      10 Oct 2020 07:25    TPro  6.5  /  Alb  3.8  /  TBili  0.4  /  DBili  x   /  AST  16  /  ALT  12  /  AlkPhos  61  10-10    LIVER FUNCTIONS - ( 10 Oct 2020 07:25 )  Alb: 3.8 g/dL / Pro: 6.5 g/dL / ALK PHOS: 61 U/L / ALT: 12 U/L / AST: 16 U/L / GGT: x             MICROBIOLOGY:  RECENT CULTURES:  10-09 @ 14:21 .Blood Blood-Peripheral     No growth to date.          RADIOLOGY REVIEWED:

## 2020-10-11 NOTE — PROGRESS NOTE ADULT - SUBJECTIVE AND OBJECTIVE BOX
CARDIOLOGY     PROGRESS  NOTE   ________________________________________________    CHIEF COMPLAINT:Patient is a 60y old  Male who presents with a chief complaint of osteomyelitis (11 Oct 2020 06:00)  no complain.  	  REVIEW OF SYSTEMS:  CONSTITUTIONAL: No fever, weight loss, or fatigue  EYES: No eye pain, visual disturbances, or discharge  ENT:  No difficulty hearing, tinnitus, vertigo; No sinus or throat pain  NECK: No pain or stiffness  RESPIRATORY: No cough, wheezing, chills or hemoptysis; No Shortness of Breath  CARDIOVASCULAR: No chest pain, palpitations, passing out, dizziness, or leg swelling  GASTROINTESTINAL: No abdominal or epigastric pain. No nausea, vomiting, or hematemesis; No diarrhea or constipation. No melena or hematochezia.  GENITOURINARY: No dysuria, frequency, hematuria, or incontinence  NEUROLOGICAL: No headaches, memory loss, loss of strength, numbness, or tremors  SKIN: No itching, burning, rashes, or lesions   LYMPH Nodes: No enlarged glands  ENDOCRINE: No heat or cold intolerance; No hair loss  MUSCULOSKELETAL: No joint pain or swelling; No muscle, back, or extremity pain  PSYCHIATRIC: No depression, anxiety, mood swings, or difficulty sleeping  HEME/LYMPH: No easy bruising, or bleeding gums  ALLERGY AND IMMUNOLOGIC: No hives or eczema	    [ ] All others negative	  [ ] Unable to obtain    PHYSICAL EXAM:  T(C): 36.6 (10-11-20 @ 04:45), Max: 36.8 (10-10-20 @ 19:41)  HR: 76 (10-11-20 @ 04:45) (76 - 96)  BP: 121/79 (10-11-20 @ 04:45) (111/75 - 121/79)  RR: 18 (10-11-20 @ 04:45) (18 - 18)  SpO2: 98% (10-11-20 @ 04:45) (97% - 98%)  Wt(kg): --  I&O's Summary    10 Oct 2020 07:01  -  11 Oct 2020 07:00  --------------------------------------------------------  IN: 1090 mL / OUT: 0 mL / NET: 1090 mL        Appearance: Normal	  HEENT:   Normal oral mucosa, PERRL, EOMI	  Lymphatic: No lymphadenopathy  Cardiovascular: Normal S1 S2, No JVD, + murmurs, No edema  Respiratory: Lungs clear to auscultation	  Psychiatry: A & O x 3, Mood & affect appropriate  Gastrointestinal:  Soft, Non-tender, + BS	  Skin: No rashes, No ecchymoses, No cyanosis	  Neurologic: Non-focal  Extremities: Normal range of motion, L 3rd toe amputation  Vascular: Peripheral pulses palpable 2+ bilaterally    MEDICATIONS  (STANDING):  amLODIPine   Tablet 5 milliGRAM(s) Oral daily  aspirin enteric coated 81 milliGRAM(s) Oral daily  heparin   Injectable 5000 Unit(s) SubCutaneous every 12 hours  influenza   Vaccine 0.5 milliLiter(s) IntraMuscular once  losartan 50 milliGRAM(s) Oral daily      TELEMETRY: 	    ECG:  	  RADIOLOGY:  OTHER: 	  	  LABS:	 	    CARDIAC MARKERS:                                11.2   7.46  )-----------( 253      ( 10 Oct 2020 07:25 )             32.9     10-10    133<L>  |  96  |  16  ----------------------------<  86  3.9   |  24  |  0.82    Ca    9.5      10 Oct 2020 07:25    TPro  6.5  /  Alb  3.8  /  TBili  0.4  /  DBili  x   /  AST  16  /  ALT  12  /  AlkPhos  61  10-10    proBNP:   Lipid Profile: Cholesterol 168  LDL 85  HDL 72  TG 58    HgA1c:   TSH: Thyroid Stimulating Hormone, Serum: 1.35 uIU/mL (09-24 @ 08:47)    PT/INR - ( 09 Oct 2020 10:32 )   PT: 11.0 sec;   INR: 0.91 ratio         PTT - ( 09 Oct 2020 10:32 )  PTT:31.0 sec  COVID-19 PCR . (10.09.20 @ 18:01)    COVID-19 PCR: NotDetec: This test has been validated by [employer] to be accurate;  though it has not been FDA cleared/approved by the usual pathway.  As with all laboratory tests, results should be correlated with clinical  findings.  https://www.fda.gov/media/724582/download  https://www.fda.gov/media/919495/download        Assessment and plan  ---------------------------   presenting with left third toe infection diagnosed by MRI to have osteomyelitis a  weeks days ago, now presenting for surgery with podiatry dr. sheikh,  the surgery was cancelled on the last admission sec to COVID + and was dc d to be back for amputation.  pt was sent by podiatry to be admitted  will get ID clearance for COVID and OSTEO  echo  id appreciated, no abx  continue bp meds  dvt prophylaxis  podiatry consult   covid negative  pt is clear cardiac wise for surgery  continue current meds

## 2020-10-11 NOTE — PROGRESS NOTE ADULT - ASSESSMENT
59 yo male with osteo of left 3rd toe-distal tuft.  This is a somewhat chronic process, ulcer presenht for over 1 month.  He is not systemically ill.  He has had a recent Coronavirus infection-appears to be asymptomatic.  He initially tested positive on 9/23, he has been symptom free x over 2 weeks.  Repeat NP PCR x 2 is negative  No signs of active cellulitis.  Suggest:  1.No need for Covid treatment or isolation  2.Stable left foot findings  3.suggest limiting antibiotics to periop prophylaxis with cefazolin

## 2020-10-12 ENCOUNTER — TRANSCRIPTION ENCOUNTER (OUTPATIENT)
Age: 60
End: 2020-10-12

## 2020-10-12 ENCOUNTER — RESULT REVIEW (OUTPATIENT)
Age: 60
End: 2020-10-12

## 2020-10-12 LAB
ANION GAP SERPL CALC-SCNC: 13 MMOL/L — SIGNIFICANT CHANGE UP (ref 5–17)
APTT BLD: 30 SEC — SIGNIFICANT CHANGE UP (ref 27.5–35.5)
BUN SERPL-MCNC: 14 MG/DL — SIGNIFICANT CHANGE UP (ref 7–23)
CALCIUM SERPL-MCNC: 10 MG/DL — SIGNIFICANT CHANGE UP (ref 8.4–10.5)
CHLORIDE SERPL-SCNC: 95 MMOL/L — LOW (ref 96–108)
CO2 SERPL-SCNC: 23 MMOL/L — SIGNIFICANT CHANGE UP (ref 22–31)
CREAT SERPL-MCNC: 0.9 MG/DL — SIGNIFICANT CHANGE UP (ref 0.5–1.3)
GLUCOSE SERPL-MCNC: 98 MG/DL — SIGNIFICANT CHANGE UP (ref 70–99)
HCT VFR BLD CALC: 32.6 % — LOW (ref 39–50)
HGB BLD-MCNC: 11.3 G/DL — LOW (ref 13–17)
INR BLD: 0.96 RATIO — SIGNIFICANT CHANGE UP (ref 0.88–1.16)
MCHC RBC-ENTMCNC: 32.9 PG — SIGNIFICANT CHANGE UP (ref 27–34)
MCHC RBC-ENTMCNC: 34.7 GM/DL — SIGNIFICANT CHANGE UP (ref 32–36)
MCV RBC AUTO: 95 FL — SIGNIFICANT CHANGE UP (ref 80–100)
NRBC # BLD: 0 /100 WBCS — SIGNIFICANT CHANGE UP (ref 0–0)
PLATELET # BLD AUTO: 242 K/UL — SIGNIFICANT CHANGE UP (ref 150–400)
POTASSIUM SERPL-MCNC: 3.6 MMOL/L — SIGNIFICANT CHANGE UP (ref 3.5–5.3)
POTASSIUM SERPL-SCNC: 3.6 MMOL/L — SIGNIFICANT CHANGE UP (ref 3.5–5.3)
PROTHROM AB SERPL-ACNC: 11.3 SEC — SIGNIFICANT CHANGE UP (ref 10.6–13.6)
RBC # BLD: 3.43 M/UL — LOW (ref 4.2–5.8)
RBC # FLD: 11.9 % — SIGNIFICANT CHANGE UP (ref 10.3–14.5)
SODIUM SERPL-SCNC: 131 MMOL/L — LOW (ref 135–145)
WBC # BLD: 7.1 K/UL — SIGNIFICANT CHANGE UP (ref 3.8–10.5)
WBC # FLD AUTO: 7.1 K/UL — SIGNIFICANT CHANGE UP (ref 3.8–10.5)

## 2020-10-12 PROCEDURE — 88311 DECALCIFY TISSUE: CPT | Mod: 26

## 2020-10-12 PROCEDURE — 88305 TISSUE EXAM BY PATHOLOGIST: CPT | Mod: 26

## 2020-10-12 PROCEDURE — 73630 X-RAY EXAM OF FOOT: CPT | Mod: 26,LT

## 2020-10-12 RX ORDER — MORPHINE SULFATE 50 MG/1
2 CAPSULE, EXTENDED RELEASE ORAL EVERY 4 HOURS
Refills: 0 | Status: DISCONTINUED | OUTPATIENT
Start: 2020-10-12 | End: 2020-10-13

## 2020-10-12 RX ORDER — ONDANSETRON 8 MG/1
4 TABLET, FILM COATED ORAL ONCE
Refills: 0 | Status: DISCONTINUED | OUTPATIENT
Start: 2020-10-12 | End: 2020-10-12

## 2020-10-12 RX ORDER — HYDROMORPHONE HYDROCHLORIDE 2 MG/ML
1 INJECTION INTRAMUSCULAR; INTRAVENOUS; SUBCUTANEOUS
Refills: 0 | Status: DISCONTINUED | OUTPATIENT
Start: 2020-10-12 | End: 2020-10-12

## 2020-10-12 RX ORDER — ASPIRIN/CALCIUM CARB/MAGNESIUM 324 MG
81 TABLET ORAL DAILY
Refills: 0 | Status: DISCONTINUED | OUTPATIENT
Start: 2020-10-12 | End: 2020-10-13

## 2020-10-12 RX ORDER — OXYCODONE AND ACETAMINOPHEN 5; 325 MG/1; MG/1
1 TABLET ORAL EVERY 4 HOURS
Refills: 0 | Status: DISCONTINUED | OUTPATIENT
Start: 2020-10-12 | End: 2020-10-13

## 2020-10-12 RX ORDER — DEXAMETHASONE 0.5 MG/5ML
8 ELIXIR ORAL ONCE
Refills: 0 | Status: DISCONTINUED | OUTPATIENT
Start: 2020-10-12 | End: 2020-10-12

## 2020-10-12 RX ORDER — CHLORHEXIDINE GLUCONATE 213 G/1000ML
1 SOLUTION TOPICAL ONCE
Refills: 0 | Status: COMPLETED | OUTPATIENT
Start: 2020-10-12 | End: 2020-10-12

## 2020-10-12 RX ORDER — ACETAMINOPHEN 500 MG
650 TABLET ORAL EVERY 6 HOURS
Refills: 0 | Status: DISCONTINUED | OUTPATIENT
Start: 2020-10-12 | End: 2020-10-13

## 2020-10-12 RX ORDER — HYDROMORPHONE HYDROCHLORIDE 2 MG/ML
0.5 INJECTION INTRAMUSCULAR; INTRAVENOUS; SUBCUTANEOUS
Refills: 0 | Status: DISCONTINUED | OUTPATIENT
Start: 2020-10-12 | End: 2020-10-12

## 2020-10-12 RX ORDER — LOSARTAN POTASSIUM 100 MG/1
50 TABLET, FILM COATED ORAL DAILY
Refills: 0 | Status: DISCONTINUED | OUTPATIENT
Start: 2020-10-12 | End: 2020-10-13

## 2020-10-12 RX ORDER — HEPARIN SODIUM 5000 [USP'U]/ML
5000 INJECTION INTRAVENOUS; SUBCUTANEOUS EVERY 12 HOURS
Refills: 0 | Status: DISCONTINUED | OUTPATIENT
Start: 2020-10-12 | End: 2020-10-13

## 2020-10-12 RX ORDER — AMLODIPINE BESYLATE 2.5 MG/1
5 TABLET ORAL DAILY
Refills: 0 | Status: DISCONTINUED | OUTPATIENT
Start: 2020-10-12 | End: 2020-10-13

## 2020-10-12 RX ADMIN — AMLODIPINE BESYLATE 5 MILLIGRAM(S): 2.5 TABLET ORAL at 05:08

## 2020-10-12 RX ADMIN — CHLORHEXIDINE GLUCONATE 1 APPLICATION(S): 213 SOLUTION TOPICAL at 12:45

## 2020-10-12 RX ADMIN — LOSARTAN POTASSIUM 50 MILLIGRAM(S): 100 TABLET, FILM COATED ORAL at 05:08

## 2020-10-12 RX ADMIN — HEPARIN SODIUM 5000 UNIT(S): 5000 INJECTION INTRAVENOUS; SUBCUTANEOUS at 05:08

## 2020-10-12 NOTE — BRIEF OPERATIVE NOTE - OPERATION/FINDINGS
s/p left foot partial 3rd toe amputation   10cc EBL   Low concern for residual infection  Low concern for viability

## 2020-10-12 NOTE — PROGRESS NOTE ADULT - SUBJECTIVE AND OBJECTIVE BOX
CARDIOLOGY     PROGRESS  NOTE   ________________________________________________    CHIEF COMPLAINT:Patient is a 60y old  Male who presents with a chief complaint of osteomyelitis (12 Oct 2020 08:14)    	  REVIEW OF SYSTEMS:  CONSTITUTIONAL: No fever, weight loss, or fatigue  EYES: No eye pain, visual disturbances, or discharge  ENT:  No difficulty hearing, tinnitus, vertigo; No sinus or throat pain  NECK: No pain or stiffness  RESPIRATORY: No cough, wheezing, chills or hemoptysis; No Shortness of Breath  CARDIOVASCULAR: No chest pain, palpitations, passing out, dizziness, or leg swelling  GASTROINTESTINAL: No abdominal or epigastric pain. No nausea, vomiting, or hematemesis; No diarrhea or constipation. No melena or hematochezia.  GENITOURINARY: No dysuria, frequency, hematuria, or incontinence  NEUROLOGICAL: No headaches, memory loss, loss of strength, numbness, or tremors  SKIN: No itching, burning, rashes, or lesions   LYMPH Nodes: No enlarged glands  ENDOCRINE: No heat or cold intolerance; No hair loss  MUSCULOSKELETAL: No joint pain or swelling; No muscle, back, or extremity pain  PSYCHIATRIC: No depression, anxiety, mood swings, or difficulty sleeping  HEME/LYMPH: No easy bruising, or bleeding gums  ALLERGY AND IMMUNOLOGIC: No hives or eczema	    [ ] All others negative	  [ ] Unable to obtain    PHYSICAL EXAM:  T(C): 36.5 (10-12-20 @ 04:35), Max: 36.8 (10-11-20 @ 20:09)  HR: 74 (10-12-20 @ 04:35) (74 - 97)  BP: 110/74 (10-12-20 @ 04:35) (106/75 - 118/77)  RR: 18 (10-12-20 @ 04:35) (18 - 18)  SpO2: 97% (10-12-20 @ 04:35) (97% - 98%)  Wt(kg): --  I&O's Summary    11 Oct 2020 07:01  -  12 Oct 2020 07:00  --------------------------------------------------------  IN: 750 mL / OUT: 0 mL / NET: 750 mL        Appearance: Normal	  HEENT:   Normal oral mucosa, PERRL, EOMI	  Lymphatic: No lymphadenopathy  Cardiovascular: Normal S1 S2, No JVD, No murmurs, No edema  Respiratory: Lungs clear to auscultation	  Psychiatry: A & O x 3, Mood & affect appropriate  Gastrointestinal:  Soft, Non-tender, + BS	  Skin: No rashes, No ecchymoses, No cyanosis	  Neurologic: Non-focal  Extremities: Normal range of motion, +osteo 3rd toe  Vascular: Peripheral pulses palpable 2+ bilaterally    MEDICATIONS  (STANDING):  amLODIPine   Tablet 5 milliGRAM(s) Oral daily  aspirin enteric coated 81 milliGRAM(s) Oral daily  heparin   Injectable 5000 Unit(s) SubCutaneous every 12 hours  influenza   Vaccine 0.5 milliLiter(s) IntraMuscular once  losartan 50 milliGRAM(s) Oral daily      TELEMETRY: 	    ECG:  	  RADIOLOGY:  OTHER: 	  	  LABS:	 	    CARDIAC MARKERS:                                11.3   7.10  )-----------( 242      ( 12 Oct 2020 06:42 )             32.6     10-12    131<L>  |  95<L>  |  14  ----------------------------<  98  3.6   |  23  |  0.90    Ca    10.0      12 Oct 2020 06:42      proBNP:   Lipid Profile: Cholesterol 168  LDL 85  HDL 72  TG 58    HgA1c:   TSH: Thyroid Stimulating Hormone, Serum: 1.35 uIU/mL (09-24 @ 08:47)          Assessment and plan  ---------------------------   presenting with left third toe infection diagnosed by MRI to have osteomyelitis a  weeks days ago, now presenting for surgery with podiatry dr. sheikh,  the surgery was cancelled on the last admission sec to COVID + and was dc d to be back for amputation.  pt was sent by podiatry to be admitted  will get ID clearance for COVID and OSTEO  echo  id appreciated, no abx  continue bp meds  dvt prophylaxis  podiatry consult  appreciated  covid negative  pt is clear cardiac wise for surgery  continue current meds

## 2020-10-12 NOTE — DISCHARGE NOTE PROVIDER - CARE PROVIDER_API CALL
Tino Wolff  PODIATRIC MEDICINE AND SURGERY  [address] [facility], [place] [address]  Phone: [phone]  Fax: [phone]  Follow Up Time:     LOUISE FAITH  Internal Medicine  [address] [place] [address]  Phone: [phone]  Fax: [phone]  Follow Up Time:

## 2020-10-12 NOTE — DISCHARGE NOTE PROVIDER - NSDCFUADDINST_GEN_ALL_CORE_FT
Podiatry Discharge Instructions:  Follow up: Please follow up with Dr. Wolff within 1 week of discharge from the hospital, please call [phone] for appointment and discuss that you recently were seen in the hospital.  Wound Care: Please apply 4x4 gauze and randolph to left foot daily.   Weight bearing: Please weight bear as tolerated in a surgical shoe.  Antibiotics: Please continue as instructed. Podiatry Discharge Instructions:  Follow up: Please follow up with Dr. Wolff within 1 week of discharge from the hospital, please call [phone] for appointment and discuss that you recently were seen in the hospital.  Wound Care: Please apply 4x4 gauze and randolph to left foot daily.   Weight bearing: Please weight bear as tolerated in a surgical shoe.

## 2020-10-12 NOTE — PROGRESS NOTE ADULT - ASSESSMENT
To OR today at 3pm with Dr. Wolff for MARCELL caterina partial 3rd ray resection  CXR on sunrise.  EKG on sunrise.  Medical/Cardiac clearance since 10/11 and documented in chart.  Consent signed and in chart.  Procedure was explained to patient in detail. All alternatives, risks and complications were discussed. All questions answered.

## 2020-10-12 NOTE — BRIEF OPERATIVE NOTE - SPECIMENS
Left third toe pathology, Left dirty distal phalanx 3rd toe culture, left clean bone proximal phalanx 3rd toe culture

## 2020-10-12 NOTE — DISCHARGE NOTE PROVIDER - NSDCMRMEDTOKEN_GEN_ALL_CORE_FT
amLODIPine 5 mg oral tablet: 1 tab(s) orally once a day  Aspirin Enteric Coated 325 mg oral delayed release tablet: 1 tab(s) orally once a day  Daily Farheen oral tablet: 1 tab(s) orally once a day  Emergen-C oral powder for reconstitution: 1 dose(s) orally , As Needed  furosemide 20 mg oral tablet: 1 tab(s) orally once a day  losartan 50 mg oral tablet: 1 tab(s) orally 2 times a day   acetaminophen 325 mg oral tablet: 2 tab(s) orally every 6 hours, As needed, Mild Pain (1 - 3)  amLODIPine 5 mg oral tablet: 1 tab(s) orally once a day  Aspirin Enteric Coated 325 mg oral delayed release tablet: 1 tab(s) orally once a day  Daily Farheen oral tablet: 1 tab(s) orally once a day  Emergen-C oral powder for reconstitution: 1 dose(s) orally , As Needed  furosemide 20 mg oral tablet: 1 tab(s) orally once a day  losartan 50 mg oral tablet: 1 tab(s) orally 2 times a day  Out Patient Physical Therapy:   oxycodone-acetaminophen 5 mg-325 mg oral tablet: 1 tab(s) orally every 4 hours, As needed, Moderate Pain (4 - 6) MDD:6

## 2020-10-12 NOTE — DISCHARGE NOTE PROVIDER - NSDCCPCAREPLAN_GEN_ALL_CORE_FT
PRINCIPAL DISCHARGE DIAGNOSIS  Diagnosis: Osteomyelitis of toe  Assessment and Plan of Treatment:       SECONDARY DISCHARGE DIAGNOSES  Diagnosis: Status post amputation of toe of left foot  Assessment and Plan of Treatment:

## 2020-10-12 NOTE — CONSULT NOTE ADULT - SUBJECTIVE AND OBJECTIVE BOX
HPI:   presenting with left third toe infection diagnosed by MRI to have osteomyelitis a  weeks days ago, now presenting for surgery with podiatry dr. sheikh,  the surgery was cancelled on the last admission sec to COVID + and was dc d to be back for amputation.    PAST MEDICAL & SURGICAL HISTORY:  HTN (hypertension)    No significant past surgical history        MEDICATIONS  (STANDING):  noted    MEDICATIONS  (PRN):      FAMILY HISTORY:      SOCIAL HISTORY:    [ ] Non-smoker  [ ] Smoker  [ ] Alcohol    Allergies    hydrochlorothiazide (Other)  penicillin (Unknown)    Intolerances    	    REVIEW OF SYSTEMS:  CONSTITUTIONAL: No fever, weight loss, or fatigue  EYES: No eye pain, visual disturbances, or discharge  ENT:  No difficulty hearing, tinnitus, vertigo; No sinus or throat pain  NECK: No pain or stiffness  RESPIRATORY: No cough, wheezing, chills or hemoptysis; No Shortness of Breath  CARDIOVASCULAR: No chest pain, palpitations, passing out, dizziness, or leg swelling  GASTROINTESTINAL: No abdominal or epigastric pain. No nausea, vomiting, or hematemesis; No diarrhea or constipation. No melena or hematochezia.  GENITOURINARY: No dysuria, frequency, hematuria, or incontinence  NEUROLOGICAL: No headaches, memory loss, loss of strength, numbness, or tremors  SKIN: No itching, burning, rashes, or lesions   LYMPH Nodes: No enlarged glands  ENDOCRINE: No heat or cold intolerance; No hair loss  MUSCULOSKELETAL: No joint pain or swelling; No muscle, back, + 3rd toe pain.  PSYCHIATRIC: No depression, anxiety, mood swings, or difficulty sleeping  HEME/LYMPH: No easy bruising, or bleeding gums  ALLERGY AND IMMUNOLOGIC: No hives or eczema	    [ ] All others negative	  [ ] Unable to obtain    PHYSICAL EXAM:  T(C): 36.7 (10-09-20 @ 10:14), Max: 37 (10-09-20 @ 09:23)  HR: 69 (10-09-20 @ 10:14) (64 - 69)  BP: 107/70 (10-09-20 @ 10:14) (101/72 - 107/70)  RR: 18 (10-09-20 @ 10:14) (16 - 18)  SpO2: 100% (10-09-20 @ 10:14) (99% - 100%)  Wt(kg): --  I&O's Summary      Appearance: Normal	  HEENT:   Normal oral mucosa, PERRL, EOMI	  Lymphatic: No lymphadenopathy  Cardiovascular: Normal S1 S2, No JVD, + murmurs, No edema  Respiratory: Lungs clear to auscultation	  Psychiatry: A & O x 3, Mood & affect appropriate  Gastrointestinal:  Soft, Non-tender, + BS	  Skin: No rashes, No ecchymoses, No cyanosis	  Neurologic: Non-focal  Extremities: Normal range of motion, + osteo  3rd l toe  Vascular: Peripheral pulses palpable 2+ bilaterally    TELEMETRY: 	    ECG:  	  RADIOLOGY:  OTHER: 	  	  LABS:	 	    CARDIAC MARKERS:                              12.2   9.68  )-----------( 263      ( 09 Oct 2020 10:32 )             34.1           proBNP:   Lipid Profile:   HgA1c:   TSH:       PREVIOUS DIAGNOSTIC TESTING:      < from: MR Foot w/wo IV Cont, Left (09.22.20 @ 11:58) >  1. Osteomyelitis of the third distal phalanx with faint edema of the third middle phalanx that may be reactive or reflect additional infection.  2. No osteomyelitis of the second toe.  3. An email was sent to Dr. SHIRLENE TELLO on 9/22/2020 12:48 PM.           (09 Oct 2020 10:54)      REVIEW OF SYSTEMS:  GEN: no fever,    no chills  RESP: no SOB,   no cough  CVS: no chest pain,   no palpitations  GI: no abdominal pain,   no nausea,   no vomiting,   no constipation,   no diarrhea  : no dysuria,   no frequency  NEURO: no headache,   no dizziness  PSYCH: no depression,   not anxious  Derm : no rash    Allergies    hydrochlorothiazide (Other)  penicillin (Unknown)    Intolerances        CAPILLARY BLOOD GLUCOSE        I&O's Summary    11 Oct 2020 07:01  -  12 Oct 2020 07:00  --------------------------------------------------------  IN: 750 mL / OUT: 0 mL / NET: 750 mL        Vital Signs Last 24 Hrs  T(C): 36.5 (12 Oct 2020 04:35), Max: 36.8 (11 Oct 2020 20:09)  T(F): 97.7 (12 Oct 2020 04:35), Max: 98.2 (11 Oct 2020 20:09)  HR: 74 (12 Oct 2020 04:35) (74 - 97)  BP: 110/74 (12 Oct 2020 04:35) (106/75 - 118/77)  BP(mean): --  RR: 18 (12 Oct 2020 04:35) (18 - 18)  SpO2: 97% (12 Oct 2020 04:35) (97% - 98%)  PHYSICAL EXAM:  GENERAL: NAD,   HEAD:  Atraumatic, Normocephalic  EYES: EOMI,  NECK: Supple, No JVD  CHEST/LUNG: Clear to auscultation bilaterally; No wheeze  HEART: Regular rate and rhythm;        murmur  ABDOMEN: Soft, Nontender,   EXTREMITIES:    no     edema/  osteo  foot  NEUROLOGY:  alert    MEDICATIONS  (STANDING):  amLODIPine   Tablet 5 milliGRAM(s) Oral daily  aspirin enteric coated 81 milliGRAM(s) Oral daily  heparin   Injectable 5000 Unit(s) SubCutaneous every 12 hours  influenza   Vaccine 0.5 milliLiter(s) IntraMuscular once  losartan 50 milliGRAM(s) Oral daily    MEDICATIONS  (PRN):    LABS:                        11.3   7.10  )-----------( 242      ( 12 Oct 2020 06:42 )             32.6     10-12    131<L>  |  95<L>  |  14  ----------------------------<  98  3.6   |  23  |  0.90    Ca    10.0      12 Oct 2020 06:42                      Thyroid Stimulating Hormone, Serum: 1.35 uIU/mL (09-24 @ 08:47)          Consultant(s) Notes Reviewed:      Care Discussed with Consultants/Other Providers:  Plan:

## 2020-10-12 NOTE — BRIEF OPERATIVE NOTE - COMMENTS
s/p left foot partial 3rd toe amputation  low concern for residual infection  low concern for viability   stable for discharge pending appearance tomorrow, do not need to wait for cultures  pt to f/u with Dr. Wolff at office within 1 week of d/c

## 2020-10-12 NOTE — PROGRESS NOTE ADULT - SUBJECTIVE AND OBJECTIVE BOX
Podiatry Pager #: [phone]    Patient is a 60y old  Male who presents with a chief complaint of osteomyelitis (11 Oct 2020 09:56)      INTERVAL HPI/OVERNIGHT EVENTS:   Pt is scheduled for L foot partial 3rd ray resection with Dr. Wolff at 3:00pm. Patient is aware of procedure and is NPO since midnight.    MEDICATIONS  (STANDING):  amLODIPine   Tablet 5 milliGRAM(s) Oral daily  aspirin enteric coated 81 milliGRAM(s) Oral daily  heparin   Injectable 5000 Unit(s) SubCutaneous every 12 hours  influenza   Vaccine 0.5 milliLiter(s) IntraMuscular once  losartan 50 milliGRAM(s) Oral daily    MEDICATIONS  (PRN):      Allergies    hydrochlorothiazide (Other)  penicillin (Unknown)    Intolerances        Vital Signs Last 24 Hrs  T(C): 36.5 (12 Oct 2020 04:35), Max: 36.8 (11 Oct 2020 20:09)  T(F): 97.7 (12 Oct 2020 04:35), Max: 98.2 (11 Oct 2020 20:09)  HR: 74 (12 Oct 2020 04:35) (74 - 97)  BP: 110/74 (12 Oct 2020 04:35) (106/75 - 118/77)  BP(mean): --  RR: 18 (12 Oct 2020 04:35) (18 - 18)  SpO2: 97% (12 Oct 2020 04:35) (97% - 98%)    LABS:                        11.2   7.46  )-----------( 253      ( 10 Oct 2020 07:25 )             32.9     10-10    133<L>  |  96  |  16  ----------------------------<  86  3.9   |  24  |  0.82    Ca    9.5      10 Oct 2020 07:25    TPro  6.5  /  Alb  3.8  /  TBili  0.4  /  DBili  x   /  AST  16  /  ALT  12  /  AlkPhos  61  10-10        CAPILLARY BLOOD GLUCOSE

## 2020-10-12 NOTE — CONSULT NOTE ADULT - ASSESSMENT
60M    h/o HTN  sent to er  for  osteomyelitis, of  left  3rd  toe/ afberile/  normal  wbc  mri on 9/22,  osteo  of  left   3rd toe    s/p   Excisional debridement of L foot distal digital wound on 9/23  HTN,  on cozaar/  norvasc/   s/p  keflex  for  10  days      admitted  for  osteo, Left foot  HTN, on norvasc/  cozaar  OR today  pe r podiatry  cleared   for  OR/  discussed  with  card     mr< from: MR Foot w/wo IV Cont, Left (09.22.20 @ 11:58) >  IMPRESSION:  1. Osteomyelitis of the third distal phalanx with faint edema of the third middle phalanx that may be reactive or reflect additional infection.  2. No osteomyelitis of the second toe.  3. An email was sent to Dr. SHIRLENE TELLO on 9/22/2020 12:48 PM.  < end of copied text

## 2020-10-12 NOTE — DISCHARGE NOTE PROVIDER - HOSPITAL COURSE
61yo M w/ left foot 3rd digit wound to bone,   s/p L foot partial 3rd digit amp (DOS 10/12/20)  - afebrile, no leukocytosis POD #1  - surgical site sutures intact w/ no dehiscence, no hematoma, stable w/ no acute signs of infection, flaps warm and viable w/ no necrosis  - low concern for residual infection   - pod stable for discharge   - WBAT to the L foot in surgical shoe with weight to heel  - keep dressing clean, dry and intact until outpt f/u  - to follow up w/ Dr. Wolff w/ in 7 days of discharge  - No need for further antibiotic treatment at this time

## 2020-10-13 ENCOUNTER — TRANSCRIPTION ENCOUNTER (OUTPATIENT)
Age: 60
End: 2020-10-13

## 2020-10-13 VITALS
SYSTOLIC BLOOD PRESSURE: 127 MMHG | RESPIRATION RATE: 18 BRPM | HEART RATE: 87 BPM | DIASTOLIC BLOOD PRESSURE: 78 MMHG | OXYGEN SATURATION: 98 % | TEMPERATURE: 98 F

## 2020-10-13 LAB
ANION GAP SERPL CALC-SCNC: 15 MMOL/L — SIGNIFICANT CHANGE UP (ref 5–17)
BUN SERPL-MCNC: 13 MG/DL — SIGNIFICANT CHANGE UP (ref 7–23)
CALCIUM SERPL-MCNC: 9.8 MG/DL — SIGNIFICANT CHANGE UP (ref 8.4–10.5)
CHLORIDE SERPL-SCNC: 98 MMOL/L — SIGNIFICANT CHANGE UP (ref 96–108)
CO2 SERPL-SCNC: 21 MMOL/L — LOW (ref 22–31)
CREAT SERPL-MCNC: 0.8 MG/DL — SIGNIFICANT CHANGE UP (ref 0.5–1.3)
GLUCOSE SERPL-MCNC: 104 MG/DL — HIGH (ref 70–99)
GRAM STN FLD: SIGNIFICANT CHANGE UP
GRAM STN FLD: SIGNIFICANT CHANGE UP
HCT VFR BLD CALC: 32.7 % — LOW (ref 39–50)
HGB BLD-MCNC: 11.5 G/DL — LOW (ref 13–17)
MCHC RBC-ENTMCNC: 33.3 PG — SIGNIFICANT CHANGE UP (ref 27–34)
MCHC RBC-ENTMCNC: 35.2 GM/DL — SIGNIFICANT CHANGE UP (ref 32–36)
MCV RBC AUTO: 94.8 FL — SIGNIFICANT CHANGE UP (ref 80–100)
NRBC # BLD: 0 /100 WBCS — SIGNIFICANT CHANGE UP (ref 0–0)
PLATELET # BLD AUTO: 227 K/UL — SIGNIFICANT CHANGE UP (ref 150–400)
POTASSIUM SERPL-MCNC: 3.7 MMOL/L — SIGNIFICANT CHANGE UP (ref 3.5–5.3)
POTASSIUM SERPL-SCNC: 3.7 MMOL/L — SIGNIFICANT CHANGE UP (ref 3.5–5.3)
RBC # BLD: 3.45 M/UL — LOW (ref 4.2–5.8)
RBC # FLD: 11.9 % — SIGNIFICANT CHANGE UP (ref 10.3–14.5)
SODIUM SERPL-SCNC: 134 MMOL/L — LOW (ref 135–145)
SPECIMEN SOURCE: SIGNIFICANT CHANGE UP
SPECIMEN SOURCE: SIGNIFICANT CHANGE UP
WBC # BLD: 8.75 K/UL — SIGNIFICANT CHANGE UP (ref 3.8–10.5)
WBC # FLD AUTO: 8.75 K/UL — SIGNIFICANT CHANGE UP (ref 3.8–10.5)

## 2020-10-13 PROCEDURE — 83605 ASSAY OF LACTIC ACID: CPT

## 2020-10-13 PROCEDURE — 85652 RBC SED RATE AUTOMATED: CPT

## 2020-10-13 PROCEDURE — 82435 ASSAY OF BLOOD CHLORIDE: CPT

## 2020-10-13 PROCEDURE — 87070 CULTURE OTHR SPECIMN AEROBIC: CPT

## 2020-10-13 PROCEDURE — 85610 PROTHROMBIN TIME: CPT

## 2020-10-13 PROCEDURE — U0003: CPT

## 2020-10-13 PROCEDURE — 86769 SARS-COV-2 COVID-19 ANTIBODY: CPT

## 2020-10-13 PROCEDURE — 84295 ASSAY OF SERUM SODIUM: CPT

## 2020-10-13 PROCEDURE — 80048 BASIC METABOLIC PNL TOTAL CA: CPT

## 2020-10-13 PROCEDURE — 82947 ASSAY GLUCOSE BLOOD QUANT: CPT

## 2020-10-13 PROCEDURE — 82803 BLOOD GASES ANY COMBINATION: CPT

## 2020-10-13 PROCEDURE — 86140 C-REACTIVE PROTEIN: CPT

## 2020-10-13 PROCEDURE — 87075 CULTR BACTERIA EXCEPT BLOOD: CPT

## 2020-10-13 PROCEDURE — 86901 BLOOD TYPING SEROLOGIC RH(D): CPT

## 2020-10-13 PROCEDURE — 82565 ASSAY OF CREATININE: CPT

## 2020-10-13 PROCEDURE — 99285 EMERGENCY DEPT VISIT HI MDM: CPT | Mod: 25

## 2020-10-13 PROCEDURE — 87635 SARS-COV-2 COVID-19 AMP PRB: CPT

## 2020-10-13 PROCEDURE — 85730 THROMBOPLASTIN TIME PARTIAL: CPT

## 2020-10-13 PROCEDURE — 86900 BLOOD TYPING SEROLOGIC ABO: CPT

## 2020-10-13 PROCEDURE — 86850 RBC ANTIBODY SCREEN: CPT

## 2020-10-13 PROCEDURE — 88311 DECALCIFY TISSUE: CPT

## 2020-10-13 PROCEDURE — 85018 HEMOGLOBIN: CPT

## 2020-10-13 PROCEDURE — 88305 TISSUE EXAM BY PATHOLOGIST: CPT

## 2020-10-13 PROCEDURE — 85025 COMPLETE CBC W/AUTO DIFF WBC: CPT

## 2020-10-13 PROCEDURE — 73630 X-RAY EXAM OF FOOT: CPT

## 2020-10-13 PROCEDURE — 80053 COMPREHEN METABOLIC PANEL: CPT

## 2020-10-13 PROCEDURE — 84132 ASSAY OF SERUM POTASSIUM: CPT

## 2020-10-13 PROCEDURE — 87186 SC STD MICRODIL/AGAR DIL: CPT

## 2020-10-13 PROCEDURE — 87040 BLOOD CULTURE FOR BACTERIA: CPT

## 2020-10-13 PROCEDURE — 82330 ASSAY OF CALCIUM: CPT

## 2020-10-13 PROCEDURE — 97161 PT EVAL LOW COMPLEX 20 MIN: CPT

## 2020-10-13 PROCEDURE — 71045 X-RAY EXAM CHEST 1 VIEW: CPT

## 2020-10-13 PROCEDURE — 85014 HEMATOCRIT: CPT

## 2020-10-13 PROCEDURE — 85027 COMPLETE CBC AUTOMATED: CPT

## 2020-10-13 PROCEDURE — 93005 ELECTROCARDIOGRAM TRACING: CPT

## 2020-10-13 RX ORDER — ACETAMINOPHEN 500 MG
2 TABLET ORAL
Qty: 0 | Refills: 0 | DISCHARGE
Start: 2020-10-13

## 2020-10-13 RX ADMIN — AMLODIPINE BESYLATE 5 MILLIGRAM(S): 2.5 TABLET ORAL at 05:15

## 2020-10-13 RX ADMIN — HEPARIN SODIUM 5000 UNIT(S): 5000 INJECTION INTRAVENOUS; SUBCUTANEOUS at 05:14

## 2020-10-13 RX ADMIN — LOSARTAN POTASSIUM 50 MILLIGRAM(S): 100 TABLET, FILM COATED ORAL at 05:15

## 2020-10-13 RX ADMIN — Medication 81 MILLIGRAM(S): at 12:30

## 2020-10-13 NOTE — PROGRESS NOTE ADULT - ASSESSMENT
61yo M w/ left foot 3rd digit wound to bone, s/p L foot partial 3rd digit amp (DOS 10/12/20)    - pt seen and evaluated, POD 1  - afebrile, no leukocytosis  - surgical site sutures intact w/ no dehiscence, no hematoma, stable w/ no acute signs of infection, flaps warm and viable w/ no necrosis  - low concern for residual infection   - pod stable for discharge   - WBAT to the L foot in surgical shoe with weight to heel  - keep dressing clean, dry and intact until outpt f/u  - to follow up w/ Dr. Wolff w/ in 7 days of discharge  - seen with attending

## 2020-10-13 NOTE — PHYSICAL THERAPY INITIAL EVALUATION ADULT - PERTINENT HX OF CURRENT PROBLEM, REHAB EVAL
Pt is a 61 yo male admitted to [facility] on 10/9/20 presenting with L third toe infection diagnosed by MRI to have OM a few weeks days ago. Pt now presenting for surgery with podiatry dr. sheikh; the surgery was cancelled on the last admission sec to COVID+ and was dcd to be back for amputation.

## 2020-10-13 NOTE — PROGRESS NOTE ADULT - REASON FOR ADMISSION
osteomyelitis

## 2020-10-13 NOTE — PHYSICAL THERAPY INITIAL EVALUATION ADULT - ACTIVE RANGE OF MOTION EXAMINATION, REHAB EVAL
Right LE Active ROM was WFL (within functional limits)/Left UE Active ROM was WFL (within functional limits)/Left LE Active ROM was WFL (within functional limits)/L foot NT/Right UE Active ROM was WFL (within functional limits)

## 2020-10-13 NOTE — PROGRESS NOTE ADULT - SUBJECTIVE AND OBJECTIVE BOX
CARDIOLOGY     PROGRESS  NOTE   ________________________________________________    CHIEF COMPLAINT:Patient is a 60y old  Male who presents with a chief complaint of osteomyelitis (13 Oct 2020 07:40)  doing well, post op.  	  REVIEW OF SYSTEMS:  CONSTITUTIONAL: No fever, weight loss, or fatigue  EYES: No eye pain, visual disturbances, or discharge  ENT:  No difficulty hearing, tinnitus, vertigo; No sinus or throat pain  NECK: No pain or stiffness  RESPIRATORY: No cough, wheezing, chills or hemoptysis; No Shortness of Breath  CARDIOVASCULAR: No chest pain, palpitations, passing out, dizziness, or leg swelling  GASTROINTESTINAL: No abdominal or epigastric pain. No nausea, vomiting, or hematemesis; No diarrhea or constipation. No melena or hematochezia.  GENITOURINARY: No dysuria, frequency, hematuria, or incontinence  NEUROLOGICAL: No headaches, memory loss, loss of strength, numbness, or tremors  SKIN: No itching, burning, rashes, or lesions   LYMPH Nodes: No enlarged glands  ENDOCRINE: No heat or cold intolerance; No hair loss  MUSCULOSKELETAL: No joint pain or swelling; No muscle, back, or extremity pain  PSYCHIATRIC: No depression, anxiety, mood swings, or difficulty sleeping  HEME/LYMPH: No easy bruising, or bleeding gums  ALLERGY AND IMMUNOLOGIC: No hives or eczema	    [ ] All others negative	  [ ] Unable to obtain    PHYSICAL EXAM:  T(C): 36.4 (10-13-20 @ 04:33), Max: 37 (10-12-20 @ 20:29)  HR: 72 (10-13-20 @ 04:33) (59 - 91)  BP: 126/81 (10-13-20 @ 04:33) (109/80 - 130/80)  RR: 18 (10-13-20 @ 04:33) (14 - 18)  SpO2: 100% (10-13-20 @ 04:33) (98% - 100%)  Wt(kg): --  I&O's Summary    12 Oct 2020 07:01  -  13 Oct 2020 07:00  --------------------------------------------------------  IN: 480 mL / OUT: 0 mL / NET: 480 mL        Appearance: Normal	  HEENT:   Normal oral mucosa, PERRL, EOMI	  Lymphatic: No lymphadenopathy  Cardiovascular: Normal S1 S2, No JVD, + murmurs, No edema  Respiratory: Lungs clear to auscultation	  Psychiatry: A & O x 3, Mood & affect appropriate  Gastrointestinal:  Soft, Non-tender, + BS	  Skin: No rashes, No ecchymoses, No cyanosis	  Neurologic: Non-focal  Extremities: Normal range of motion, bandage  Vascular: Peripheral pulses palpable 2+ bilaterally    MEDICATIONS  (STANDING):  amLODIPine   Tablet 5 milliGRAM(s) Oral daily  aspirin enteric coated 81 milliGRAM(s) Oral daily  heparin   Injectable 5000 Unit(s) SubCutaneous every 12 hours  influenza   Vaccine 0.5 milliLiter(s) IntraMuscular once  losartan 50 milliGRAM(s) Oral daily      TELEMETRY: 	    ECG:  	  RADIOLOGY:  OTHER: 	  	  LABS:	 	    CARDIAC MARKERS:                                11.5   8.75  )-----------( 227      ( 13 Oct 2020 07:17 )             32.7     10-12    131<L>  |  95<L>  |  14  ----------------------------<  98  3.6   |  23  |  0.90    Ca    10.0      12 Oct 2020 06:42      proBNP:   Lipid Profile: Cholesterol 168  LDL 85  HDL 72  TG 58    HgA1c:   TSH: Thyroid Stimulating Hormone, Serum: 1.35 uIU/mL (09-24 @ 08:47)    PT/INR - ( 12 Oct 2020 08:37 )   PT: 11.3 sec;   INR: 0.96 ratio         PTT - ( 12 Oct 2020 08:37 )  PTT:30.0 sec    To OR today at 3pm with Dr. Wolff for L foor partial 3rd ray resection  CXR on sunrise.  EKG on sunrise.  Medical/Cardiac clearance since 10/11 and documented in chart.  Consent signed and in chart.  Procedure was explained to patient in detail. All alternatives, risks and complications were discussed. All questions answered.    PACU to floor  Stable  s/p left foot partial 3rd toe amputation  low concern for residual infection  low concern for viability   stable for discharge pending appearance tomorrow, do not need to wait for cultures  pt to f/u with Dr. Wolff at office within 1 week of d/c      Assessment and plan  ---------------------------   presenting with left third toe infection diagnosed by MRI to have osteomyelitis a  weeks days ago, now presenting for surgery with podiatry dr. sheikh,  the surgery was cancelled on the last admission sec to COVID + and was dc d to be back for amputation.  pt was sent by podiatry to be admitted  will get ID clearance for COVID and OSTEO  echo  id appreciated, no abx  continue bp meds  dvt prophylaxis  covid negative  s/p amputation  bp is well controlled  pain meds  cardiac work up as put pt  dc planning as per podiatry

## 2020-10-13 NOTE — CHART NOTE - NSCHARTNOTEFT_GEN_A_CORE
Cleared for discharge home today by Podiatry Surgery  Cleared for discharge from Medicine and Cardiology (Dr Chery / Dr Pinon) pending clearance by ID.  Dr Cuenca (ID) contacted - he has cleared patient for discharge home today OFF antibiotics  Out Patient follow up with Podiatry Surgery (Dr Wolff) in 7 days

## 2020-10-13 NOTE — PROGRESS NOTE ADULT - ASSESSMENT
60M    h/o HTN  sent to er  for  osteomyelitis, of  left  3rd  toe/ afberile/  normal  wbc  mri on 9/22,  osteo  of  left   3rd toe    s/p   Excisional debridement of L foot distal digital wound on 9/23  HTN,  on cozaar/  norvasc/   s/p  keflex  for  10  days    admitted  for  osteo, Left foot, 3RD  TOE  HTN, on norvasc/  cozaar  s/p  amputation of 3rd Left toe on 10/12  await    c/s   and  defer  ? need  fOR   antibiotics  to  ID     mr< from: MR Foot w/wo IV Cont, Left (09.22.20 @ 11:58) >  IMPRESSION:  1. Osteomyelitis of the third distal phalanx with faint edema of the third middle phalanx that may be reactive or reflect additional infection.  2. No osteomyelitis of the second toe.  3. An email was sent to Dr. SHIRLENE TELLO on 9/22/2020 12:48 PM.  < end of copied text

## 2020-10-13 NOTE — PHYSICAL THERAPY INITIAL EVALUATION ADULT - ADDITIONAL COMMENTS
PTA pt lives with his spouse & sister in law in a private house. Pt is independent with adl's & ambulation, owns a cane. pt lives with his spouse & sister in law in a private house. +4 steps to enter with b/l rails, +flight of stairs to bedroom with railing. Pt is independent with adl's & ambulation, owns a cane. Hx hip replacement in February; +tub and walk in shower, +driving, +R handed

## 2020-10-13 NOTE — PROGRESS NOTE ADULT - SUBJECTIVE AND OBJECTIVE BOX
Patient is a 60y old  Male who presents with a chief complaint of osteomyelitis (13 Oct 2020 08:10)       INTERVAL HPI/OVERNIGHT EVENTS:  Patient seen and evaluated at bedside.  Pt is resting comfortable in NAD. Denies N/V/F/C.     Allergies    hydrochlorothiazide (Other)  penicillin (Unknown)    Intolerances        Vital Signs Last 24 Hrs  T(C): 36.4 (13 Oct 2020 04:33), Max: 37 (12 Oct 2020 20:29)  T(F): 97.5 (13 Oct 2020 04:33), Max: 98.6 (12 Oct 2020 20:29)  HR: 72 (13 Oct 2020 04:33) (59 - 91)  BP: 126/81 (13 Oct 2020 04:33) (109/80 - 130/80)  BP(mean): 101 (12 Oct 2020 18:40) (94 - 101)  RR: 18 (13 Oct 2020 04:33) (14 - 18)  SpO2: 100% (13 Oct 2020 04:33) (98% - 100%)    LABS:                        11.5   8.75  )-----------( 227      ( 13 Oct 2020 07:17 )             32.7     10-13    134<L>  |  98  |  13  ----------------------------<  104<H>  3.7   |  21<L>  |  0.80    Ca    9.8      13 Oct 2020 07:17      PT/INR - ( 12 Oct 2020 08:37 )   PT: 11.3 sec;   INR: 0.96 ratio         PTT - ( 12 Oct 2020 08:37 )  PTT:30.0 sec    CAPILLARY BLOOD GLUCOSE          Lower Extremity Physical Exam:  Vascular: DP/PT 2/4, B/L, CFT <3 seconds B/L, Temperature gradient warm to cool, B/L.   Neuro: Epicritic sensation absent to the level of digits, B/L.  Musculoskeletal/Ortho: L foot 3rd digit dactylitis  Skin: L foot distal 3rd digit wound, 2.0 x 1.0cm, depth to bone wound bed fibro-granular, no malodor, no purulence, etiology 2/2 pressure, neuropathy, right foot calluses on plantar forefoot    s/p L foot partial 3rd digit amp (DOS 10/12/20)  -POD1  -surgical site sutures intact w/ no dehiscence, no hematoma, stable w/ no acute signs of infection, flaps warm and viable w/ no necrosis

## 2020-10-13 NOTE — PHYSICAL THERAPY INITIAL EVALUATION ADULT - GENERAL OBSERVATIONS, REHAB EVAL
Pt a/w 3rd toe OM, s/p L foot partial 3rd ray resection, per podiatry, WBAT L heel in darco shoe. Pt received supine in bed, +IVL,  +dressing L foot, A&OX4, pleasant and cooperative, follows all commands.

## 2020-10-13 NOTE — PHYSICAL THERAPY INITIAL EVALUATION ADULT - PRECAUTIONS/LIMITATIONS, REHAB EVAL
Hospital course: covid (-) 10/9; s/p partial amputation of 3rd Left toe on 10/12; per podiatry, surgical site sutures intact w/ no dehiscence, no hematoma, stable w/ no acute signs of infection, flaps warm and viable w/ no necrosis, WBAT to the L foot in surgical shoe with weight to heel; L foot xray (-) fx/dislocation,/fall precautions

## 2020-10-13 NOTE — DISCHARGE NOTE NURSING/CASE MANAGEMENT/SOCIAL WORK - PATIENT PORTAL LINK FT
You can access the FollowMyHealth Patient Portal offered by [facility] by registering at the following website: [email or web address]. By joining [employer]’s FollowMyHealth portal, you will also be able to view your health information using other applications (apps) compatible with our system.

## 2020-10-13 NOTE — PROGRESS NOTE ADULT - SUBJECTIVE AND OBJECTIVE BOX
afberile/  post op    REVIEW OF SYSTEMS:  GEN: no fever,    no chills  RESP: no SOB,   no cough  CVS: no chest pain,   no palpitations  GI: no abdominal pain,   no nausea,   no vomiting,   no constipation,   no diarrhea  : no dysuria,   no frequency  NEURO: no headache,   no dizziness  PSYCH: no depression,   not anxious  Derm : no rash    MEDICATIONS  (STANDING):  amLODIPine   Tablet 5 milliGRAM(s) Oral daily  aspirin enteric coated 81 milliGRAM(s) Oral daily  heparin   Injectable 5000 Unit(s) SubCutaneous every 12 hours  influenza   Vaccine 0.5 milliLiter(s) IntraMuscular once  losartan 50 milliGRAM(s) Oral daily    MEDICATIONS  (PRN):  acetaminophen   Tablet .. 650 milliGRAM(s) Oral every 6 hours PRN Mild Pain (1 - 3)  morphine  - Injectable 2 milliGRAM(s) IV Push every 4 hours PRN Severe Pain (7 - 10)  oxycodone    5 mG/acetaminophen 325 mG 1 Tablet(s) Oral every 4 hours PRN Moderate Pain (4 - 6)      Vital Signs Last 24 Hrs  T(C): 36.4 (13 Oct 2020 04:33), Max: 37 (12 Oct 2020 20:29)  T(F): 97.5 (13 Oct 2020 04:33), Max: 98.6 (12 Oct 2020 20:29)  HR: 72 (13 Oct 2020 04:33) (59 - 91)  BP: 126/81 (13 Oct 2020 04:33) (109/80 - 130/80)  BP(mean): 101 (12 Oct 2020 18:40) (94 - 101)  RR: 18 (13 Oct 2020 04:33) (14 - 18)  SpO2: 100% (13 Oct 2020 04:33) (98% - 100%)  CAPILLARY BLOOD GLUCOSE        I&O's Summary    12 Oct 2020 07:01  -  13 Oct 2020 07:00  --------------------------------------------------------  IN: 480 mL / OUT: 0 mL / NET: 480 mL        PHYSICAL EXAM:  HEAD:  Atraumatic, Normocephalic  NECK: Supple, No   JVD  CHEST/LUNG:   no     rales,     no,    rhonchi  HEART: Regular rate and rhythm;         murmur  ABDOMEN: Soft, Nontender, ;   EXTREMITIES:  s/p  amputation, left toe  NEUROLOGY:  alert    LABS:                        11.3   7.10  )-----------( 242      ( 12 Oct 2020 06:42 )             32.6     10-12    131<L>  |  95<L>  |  14  ----------------------------<  98  3.6   |  23  |  0.90    Ca    10.0      12 Oct 2020 06:42      PT/INR - ( 12 Oct 2020 08:37 )   PT: 11.3 sec;   INR: 0.96 ratio         PTT - ( 12 Oct 2020 08:37 )  PTT:30.0 sec                Thyroid Stimulating Hormone, Serum: 1.35 uIU/mL (09-24 @ 08:47)        Culture - Tissue with Gram Stain (collected 10-13-20 @ 00:53)  Source: .Tissue Other  Gram Stain (10-13-20 @ 04:00):    Rare polymorphonuclear leukocytes seen per low power field    No organisms seen per oil power field    Culture - Tissue with Gram Stain (collected 10-13-20 @ 00:52)  Source: .Tissue Other  Gram Stain (10-13-20 @ 04:00):    Rare polymorphonuclear leukocytes seen per low power field    No organisms seen per oil power field        Consultant(s) Notes Reviewed:      Care Discussed with Consultants/Other Providers:

## 2020-10-14 LAB
-  AMPICILLIN/SULBACTAM: SIGNIFICANT CHANGE UP
-  CEFAZOLIN: SIGNIFICANT CHANGE UP
-  CLINDAMYCIN: SIGNIFICANT CHANGE UP
-  DAPTOMYCIN: SIGNIFICANT CHANGE UP
-  ERYTHROMYCIN: SIGNIFICANT CHANGE UP
-  GENTAMICIN: SIGNIFICANT CHANGE UP
-  LINEZOLID: SIGNIFICANT CHANGE UP
-  OXACILLIN: SIGNIFICANT CHANGE UP
-  PENICILLIN: SIGNIFICANT CHANGE UP
-  RIFAMPIN: SIGNIFICANT CHANGE UP
-  TETRACYCLINE: SIGNIFICANT CHANGE UP
-  TRIMETHOPRIM/SULFAMETHOXAZOLE: SIGNIFICANT CHANGE UP
-  VANCOMYCIN: SIGNIFICANT CHANGE UP
CULTURE RESULTS: SIGNIFICANT CHANGE UP
CULTURE RESULTS: SIGNIFICANT CHANGE UP
METHOD TYPE: SIGNIFICANT CHANGE UP
SPECIMEN SOURCE: SIGNIFICANT CHANGE UP
SPECIMEN SOURCE: SIGNIFICANT CHANGE UP

## 2020-10-16 LAB — SURGICAL PATHOLOGY STUDY: SIGNIFICANT CHANGE UP

## 2020-10-17 LAB
CULTURE RESULTS: SIGNIFICANT CHANGE UP
CULTURE RESULTS: SIGNIFICANT CHANGE UP
ORGANISM # SPEC MICROSCOPIC CNT: SIGNIFICANT CHANGE UP
ORGANISM # SPEC MICROSCOPIC CNT: SIGNIFICANT CHANGE UP
SPECIMEN SOURCE: SIGNIFICANT CHANGE UP
SPECIMEN SOURCE: SIGNIFICANT CHANGE UP

## 2021-04-15 NOTE — PATIENT PROFILE ADULT - BILL PAYMENT
POSTPARTUM ROUNDING NOTE - VAGINAL DELIVERY    Subjective     Pt is PPD #2  Denies any lightheadedness or dizziness.  Ambulatory, voiding freely  No N/V/F/C/CP/SOB.  Has not noticed any significant bleeding  Breastfeeding.  Bakri removed on     Objective      Vitals:    21 1412 21 1600 21 1702 21 2203   BP: 95/60 95/67 116/72 99/68   BP Location: RUE - Right upper extremity LUE - Left upper extremity     Patient Position: Sitting Semi-Aldana's     Pulse: 79 94 93 (!) 105   Resp: 16 17 16 16   Temp: 97.7 °F (36.5 °C) 98.2 °F (36.8 °C) 98.2 °F (36.8 °C) 97.9 °F (36.6 °C)   TempSrc: Oral Oral Oral Oral   SpO2:  98%     Weight:       Height:       LMP: 2020       General: well developed, well nourished. No acute distress.  Resp: Lungs CTAB. Respirations are unlabored, no accessory muscle usage. No respiratory distress.  CV: RRR. Normal peripheral perfusion.  Abdomen: Soft, appropriately tender. Fundus firm and below umbilicus.  Neurological: A&O x3.  Skin: Warm, dry, normal for ethnicity.   Psychiatric: Cooperative.     Assessment and Plan     Cookie Clayton is a 18 year old  on post partum day 2 status post spontaneous vaginal delivery complicated by PPH (2490cc) and shoulder dystocia. Pt doing well.     - GI: Tolerating gen diet  - : Voiding trial this AM now that bakri out  - Pain: Controlled  - Heme: antepartum hgb 13.2 >10 > 1 upRBC > 9.3 > 8.0>7.7 this AM   - Pt with PPH 2490   - Bakri removed on , fundus firm, no bleeding noted  - ID: VSS, afebrile, hypotension noted with 0200 vitals, pt asymptomatic, will continue to closely monitor in setting of PPH.  - Continue routine postpartum care  - Encourage ambulation  - Rh: O Rh Positive   - Rubella: Immune     Attending: PARVIN Sullivan PGY 1     no

## 2021-12-03 ENCOUNTER — OUTPATIENT (OUTPATIENT)
Dept: OUTPATIENT SERVICES | Facility: HOSPITAL | Age: 61
LOS: 1 days | End: 2021-12-03
Payer: COMMERCIAL

## 2021-12-03 ENCOUNTER — APPOINTMENT (OUTPATIENT)
Dept: MRI IMAGING | Facility: CLINIC | Age: 61
End: 2021-12-03
Payer: COMMERCIAL

## 2021-12-03 ENCOUNTER — RESULT REVIEW (OUTPATIENT)
Age: 61
End: 2021-12-03

## 2021-12-03 DIAGNOSIS — Z00.8 ENCOUNTER FOR OTHER GENERAL EXAMINATION: ICD-10-CM

## 2021-12-03 PROCEDURE — 73720 MRI LWR EXTREMITY W/O&W/DYE: CPT | Mod: 26,LT,76

## 2021-12-03 PROCEDURE — 73720 MRI LWR EXTREMITY W/O&W/DYE: CPT

## 2021-12-12 ENCOUNTER — TRANSCRIPTION ENCOUNTER (OUTPATIENT)
Age: 61
End: 2021-12-12

## 2021-12-12 ENCOUNTER — INPATIENT (INPATIENT)
Facility: HOSPITAL | Age: 61
LOS: 1 days | Discharge: HOME | DRG: 504 | End: 2021-12-14
Attending: INTERNAL MEDICINE | Admitting: INTERNAL MEDICINE
Payer: COMMERCIAL

## 2021-12-12 VITALS
HEIGHT: 74 IN | TEMPERATURE: 98 F | SYSTOLIC BLOOD PRESSURE: 138 MMHG | WEIGHT: 235.01 LBS | RESPIRATION RATE: 18 BRPM | HEART RATE: 88 BPM | OXYGEN SATURATION: 99 % | DIASTOLIC BLOOD PRESSURE: 93 MMHG

## 2021-12-12 DIAGNOSIS — M86.10 OTHER ACUTE OSTEOMYELITIS, UNSPECIFIED SITE: ICD-10-CM

## 2021-12-12 LAB
ALBUMIN SERPL ELPH-MCNC: 4.1 G/DL — SIGNIFICANT CHANGE UP (ref 3.3–5)
ALP SERPL-CCNC: 78 U/L — SIGNIFICANT CHANGE UP (ref 40–120)
ALT FLD-CCNC: 21 U/L — SIGNIFICANT CHANGE UP (ref 10–45)
ANION GAP SERPL CALC-SCNC: 14 MMOL/L — SIGNIFICANT CHANGE UP (ref 5–17)
APTT BLD: 32.6 SEC — SIGNIFICANT CHANGE UP (ref 27.5–35.5)
AST SERPL-CCNC: 28 U/L — SIGNIFICANT CHANGE UP (ref 10–40)
BASOPHILS # BLD AUTO: 0.12 K/UL — SIGNIFICANT CHANGE UP (ref 0–0.2)
BASOPHILS NFR BLD AUTO: 1.3 % — SIGNIFICANT CHANGE UP (ref 0–2)
BILIRUB SERPL-MCNC: 0.2 MG/DL — SIGNIFICANT CHANGE UP (ref 0.2–1.2)
BLD GP AB SCN SERPL QL: NEGATIVE — SIGNIFICANT CHANGE UP
BUN SERPL-MCNC: 9 MG/DL — SIGNIFICANT CHANGE UP (ref 7–23)
CALCIUM SERPL-MCNC: 9.3 MG/DL — SIGNIFICANT CHANGE UP (ref 8.4–10.5)
CHLORIDE SERPL-SCNC: 97 MMOL/L — SIGNIFICANT CHANGE UP (ref 96–108)
CO2 SERPL-SCNC: 21 MMOL/L — LOW (ref 22–31)
CREAT SERPL-MCNC: 0.91 MG/DL — SIGNIFICANT CHANGE UP (ref 0.5–1.3)
CRP SERPL-MCNC: 8 MG/L — HIGH (ref 0–4)
EOSINOPHIL # BLD AUTO: 0.13 K/UL — SIGNIFICANT CHANGE UP (ref 0–0.5)
EOSINOPHIL NFR BLD AUTO: 1.4 % — SIGNIFICANT CHANGE UP (ref 0–6)
ERYTHROCYTE [SEDIMENTATION RATE] IN BLOOD: 42 MM/HR — HIGH (ref 0–20)
GLUCOSE SERPL-MCNC: 74 MG/DL — SIGNIFICANT CHANGE UP (ref 70–99)
HCT VFR BLD CALC: 36.6 % — LOW (ref 39–50)
HGB BLD-MCNC: 12.9 G/DL — LOW (ref 13–17)
IMM GRANULOCYTES NFR BLD AUTO: 0.5 % — SIGNIFICANT CHANGE UP (ref 0–1.5)
INR BLD: 0.93 RATIO — SIGNIFICANT CHANGE UP (ref 0.88–1.16)
LYMPHOCYTES # BLD AUTO: 2.14 K/UL — SIGNIFICANT CHANGE UP (ref 1–3.3)
LYMPHOCYTES # BLD AUTO: 22.8 % — SIGNIFICANT CHANGE UP (ref 13–44)
MAGNESIUM SERPL-MCNC: 1.8 MG/DL — SIGNIFICANT CHANGE UP (ref 1.6–2.6)
MCHC RBC-ENTMCNC: 33.5 PG — SIGNIFICANT CHANGE UP (ref 27–34)
MCHC RBC-ENTMCNC: 35.2 GM/DL — SIGNIFICANT CHANGE UP (ref 32–36)
MCV RBC AUTO: 95.1 FL — SIGNIFICANT CHANGE UP (ref 80–100)
MONOCYTES # BLD AUTO: 1.12 K/UL — HIGH (ref 0–0.9)
MONOCYTES NFR BLD AUTO: 11.9 % — SIGNIFICANT CHANGE UP (ref 2–14)
NEUTROPHILS # BLD AUTO: 5.84 K/UL — SIGNIFICANT CHANGE UP (ref 1.8–7.4)
NEUTROPHILS NFR BLD AUTO: 62.1 % — SIGNIFICANT CHANGE UP (ref 43–77)
NRBC # BLD: 0 /100 WBCS — SIGNIFICANT CHANGE UP (ref 0–0)
PHOSPHATE SERPL-MCNC: 2.6 MG/DL — SIGNIFICANT CHANGE UP (ref 2.5–4.5)
PLATELET # BLD AUTO: 263 K/UL — SIGNIFICANT CHANGE UP (ref 150–400)
POTASSIUM SERPL-MCNC: 4.6 MMOL/L — SIGNIFICANT CHANGE UP (ref 3.5–5.3)
POTASSIUM SERPL-SCNC: 4.6 MMOL/L — SIGNIFICANT CHANGE UP (ref 3.5–5.3)
PROT SERPL-MCNC: 7.8 G/DL — SIGNIFICANT CHANGE UP (ref 6–8.3)
PROTHROM AB SERPL-ACNC: 11.2 SEC — SIGNIFICANT CHANGE UP (ref 10.6–13.6)
RBC # BLD: 3.85 M/UL — LOW (ref 4.2–5.8)
RBC # FLD: 12.1 % — SIGNIFICANT CHANGE UP (ref 10.3–14.5)
RH IG SCN BLD-IMP: NEGATIVE — SIGNIFICANT CHANGE UP
SARS-COV-2 RNA SPEC QL NAA+PROBE: SIGNIFICANT CHANGE UP
SODIUM SERPL-SCNC: 132 MMOL/L — LOW (ref 135–145)
WBC # BLD: 9.4 K/UL — SIGNIFICANT CHANGE UP (ref 3.8–10.5)
WBC # FLD AUTO: 9.4 K/UL — SIGNIFICANT CHANGE UP (ref 3.8–10.5)

## 2021-12-12 PROCEDURE — 99285 EMERGENCY DEPT VISIT HI MDM: CPT

## 2021-12-12 PROCEDURE — 73630 X-RAY EXAM OF FOOT: CPT | Mod: 26,LT

## 2021-12-12 PROCEDURE — 71045 X-RAY EXAM CHEST 1 VIEW: CPT | Mod: 26

## 2021-12-12 PROCEDURE — 93010 ELECTROCARDIOGRAM REPORT: CPT

## 2021-12-12 RX ORDER — ASPIRIN/CALCIUM CARB/MAGNESIUM 324 MG
1 TABLET ORAL
Qty: 0 | Refills: 0 | DISCHARGE

## 2021-12-12 RX ORDER — CEFEPIME 1 G/1
1000 INJECTION, POWDER, FOR SOLUTION INTRAMUSCULAR; INTRAVENOUS EVERY 8 HOURS
Refills: 0 | Status: DISCONTINUED | OUTPATIENT
Start: 2021-12-12 | End: 2021-12-13

## 2021-12-12 RX ORDER — CEFEPIME 1 G/1
2000 INJECTION, POWDER, FOR SOLUTION INTRAMUSCULAR; INTRAVENOUS ONCE
Refills: 0 | Status: COMPLETED | OUTPATIENT
Start: 2021-12-12 | End: 2021-12-12

## 2021-12-12 RX ORDER — LOSARTAN POTASSIUM 100 MG/1
50 TABLET, FILM COATED ORAL DAILY
Refills: 0 | Status: DISCONTINUED | OUTPATIENT
Start: 2021-12-12 | End: 2021-12-13

## 2021-12-12 RX ORDER — VANCOMYCIN HCL 1 G
1000 VIAL (EA) INTRAVENOUS ONCE
Refills: 0 | Status: COMPLETED | OUTPATIENT
Start: 2021-12-12 | End: 2021-12-12

## 2021-12-12 RX ORDER — HEPARIN SODIUM 5000 [USP'U]/ML
5000 INJECTION INTRAVENOUS; SUBCUTANEOUS EVERY 12 HOURS
Refills: 0 | Status: DISCONTINUED | OUTPATIENT
Start: 2021-12-12 | End: 2021-12-13

## 2021-12-12 RX ORDER — FUROSEMIDE 40 MG
20 TABLET ORAL DAILY
Refills: 0 | Status: DISCONTINUED | OUTPATIENT
Start: 2021-12-12 | End: 2021-12-13

## 2021-12-12 RX ORDER — MULTIVIT-MIN/FERROUS GLUCONATE 9 MG/15 ML
1 LIQUID (ML) ORAL
Qty: 0 | Refills: 0 | DISCHARGE

## 2021-12-12 RX ORDER — ASPIRIN/CALCIUM CARB/MAGNESIUM 324 MG
81 TABLET ORAL DAILY
Refills: 0 | Status: DISCONTINUED | OUTPATIENT
Start: 2021-12-12 | End: 2021-12-13

## 2021-12-12 RX ORDER — AMLODIPINE BESYLATE 2.5 MG/1
5 TABLET ORAL ONCE
Refills: 0 | Status: COMPLETED | OUTPATIENT
Start: 2021-12-12 | End: 2021-12-12

## 2021-12-12 RX ORDER — AMLODIPINE BESYLATE 2.5 MG/1
5 TABLET ORAL DAILY
Refills: 0 | Status: DISCONTINUED | OUTPATIENT
Start: 2021-12-12 | End: 2021-12-13

## 2021-12-12 RX ORDER — LOSARTAN POTASSIUM 100 MG/1
50 TABLET, FILM COATED ORAL ONCE
Refills: 0 | Status: COMPLETED | OUTPATIENT
Start: 2021-12-12 | End: 2021-12-12

## 2021-12-12 RX ADMIN — LOSARTAN POTASSIUM 50 MILLIGRAM(S): 100 TABLET, FILM COATED ORAL at 18:44

## 2021-12-12 RX ADMIN — Medication 250 MILLIGRAM(S): at 15:28

## 2021-12-12 RX ADMIN — CEFEPIME 100 MILLIGRAM(S): 1 INJECTION, POWDER, FOR SOLUTION INTRAMUSCULAR; INTRAVENOUS at 14:40

## 2021-12-12 RX ADMIN — AMLODIPINE BESYLATE 5 MILLIGRAM(S): 2.5 TABLET ORAL at 18:44

## 2021-12-12 RX ADMIN — CEFEPIME 100 MILLIGRAM(S): 1 INJECTION, POWDER, FOR SOLUTION INTRAMUSCULAR; INTRAVENOUS at 23:57

## 2021-12-12 RX ADMIN — HEPARIN SODIUM 5000 UNIT(S): 5000 INJECTION INTRAVENOUS; SUBCUTANEOUS at 18:44

## 2021-12-12 RX ADMIN — Medication 81 MILLIGRAM(S): at 18:47

## 2021-12-12 RX ADMIN — Medication 20 MILLIGRAM(S): at 18:45

## 2021-12-12 NOTE — ED PROVIDER NOTE - OBJECTIVE STATEMENT
61 year old male with osteomyelitis of left 2nd digit identified on outpatient MRI 61 year old male with PMH osteomyelitis of left 2nd digit identified on outpatient MRI. Had amputation of left 3rd toe for osteomyelitis per patient after possibly infection/wound from work. Has been doing wound care for 2nd digit but worsening. Saw podiatrist and had MR and recommended for possible OR given concern for osteomyelitis. Denies fevers. Former smoker.

## 2021-12-12 NOTE — H&P ADULT - HISTORY OF PRESENT ILLNESS
61  yr m  h/o HTN     was sent to [facility] by his poditarist  osteo of foot  podiatry has  seen pt     pt shital cp/spb/abd pain/ fevers

## 2021-12-12 NOTE — ED PROVIDER NOTE - ATTENDING CONTRIBUTION TO CARE
attending Radu: 61yM sent in for surgery for possible amputation of L toe due to osteomyelitis diagnosed on MR. Will obtain preop labs, podiatry eval in ED and admit

## 2021-12-12 NOTE — H&P ADULT - ASSESSMENT
60M    h/o HTN  sent to er  for  osteomyelitis, of  left  3rd  toe/ afberile/  normal  wbc  mri on 9/22,  osteo  of  left   3rd toe    s/p   Excisional debridement of L foot distal digital wound on 9/23  s/p  amputation of 3rd Left toe on 10/12        * admitted  for  osteo, Left foot, 2nd toe  on iv cefepime  mri foot, ulcer of  2nd  toe/ osteo, no colection   ID eval in am  seen by podiatry    * HTN, on norvasc/  cozaar  card dr membreno, known to pt  on dvt ppx  echo ordered       rad< from: MR Foot w/wo IV Cont, Left (12.03.21 @ 15:58) >  mpression:  Soft tissue ulcer along the distal aspect of the second toe with findings   concerning for osteomyelitis of the second distal phalanx. No drainable   fluid collection seenon postcontrast images.    Chronic appearing fragmentation of the fibular hallux sesamoid, which may   represent a bipartite fibular hallux sesamoid or be related to a chronic   fracture deformity. Osseous edema within the fibular hallux sesamoid,   which may be stress related or be related to degenerative changes.  Other findings as above.  COMMUNICATION: Findings were sent via [employer] email to Dr. Bermudez at the time of this dictation.  < end of copied text >           mr< from: MR Foot w/wo IV Cont, Left (09.22.20 @ 11:58) >  IMPRESSION:  1. Osteomyelitis of the third distal phalanx with faint edema of the third middle phalanx that may be reactive or reflect additional infection.  2. No osteomyelitis of the second toe.  3. An email was sent to Dr. SHIRLENE TELLO on 9/22/2020 12:48 PM.  < end of copied text

## 2021-12-12 NOTE — ED PROVIDER NOTE - PHYSICAL EXAMINATION
Vital Signs Last 24 Hrs  T(C): 36.4 (12 Dec 2021 09:50), Max: 36.4 (12 Dec 2021 09:50)  T(F): 97.5 (12 Dec 2021 09:50), Max: 97.5 (12 Dec 2021 09:50)  HR: 88 (12 Dec 2021 09:50) (88 - 88)  BP: 138/93 (12 Dec 2021 09:50) (138/93 - 138/93)  BP(mean): --  RR: 18 (12 Dec 2021 09:50) (18 - 18)  SpO2: 99% (12 Dec 2021 09:50) (99% - 99%) Vital Signs Last 24 Hrs  T(C): 36.4 (12 Dec 2021 09:50), Max: 36.4 (12 Dec 2021 09:50)  T(F): 97.5 (12 Dec 2021 09:50), Max: 97.5 (12 Dec 2021 09:50)  HR: 88 (12 Dec 2021 09:50) (88 - 88)  BP: 138/93 (12 Dec 2021 09:50) (138/93 - 138/93)  BP(mean): --  RR: 18 (12 Dec 2021 09:50) (18 - 18)  SpO2: 99% (12 Dec 2021 09:50) (99% - 99%)    General: Awake, alert, in no acute distress  HEENT: Normocephalic, atraumatic. EOMI. Moist mucus membranes.  Pulmonary: Symmetric chest rise. No extra work of breathing. Lungs clear to auscultation bilaterally.  Cardiovascular: Normal rate and regular rhythm. No murmurs/rubs/gallops  Abdominal: Soft, non-distended, non-tender  Skin: Left foot dressed with no soak through.  Neurologic: No focal defects  Psychiatric: Mood appropriate

## 2021-12-12 NOTE — H&P ADULT - NSHPLABSRESULTS_GEN_ALL_CORE
LABS:                        12.9   9.40  )-----------( 263      ( 12 Dec 2021 11:29 )             36.6     12-12    132<L>  |  97  |  9   ----------------------------<  74  4.6   |  21<L>  |  0.91    Ca    9.3      12 Dec 2021 11:29  Phos  2.6     12-12  Mg     1.8     12-12    TPro  7.8  /  Alb  4.1  /  TBili  0.2  /  DBili  x   /  AST  28  /  ALT  21  /  AlkPhos  78  12-12    PT/INR - ( 12 Dec 2021 11:29 )   PT: 11.2 sec;   INR: 0.93 ratio         PTT - ( 12 Dec 2021 11:29 )  PTT:32.6 sec

## 2021-12-12 NOTE — ED PROVIDER NOTE - NS ED MD DISPO DIVISION
no chest pain/no palpitations/no dyspnea on exertion/no orthopnea/no paroxysmal nocturnal dyspnea/no peripheral edema/no claudication [facility]

## 2021-12-12 NOTE — ED ADULT NURSE NOTE - OBJECTIVE STATEMENT
62 y/o male arrives to the ER ambulatory sent by MD for admission for toe surgery.  PMH of HTN.  Pt is A&Ox4, speaking coherently. Pt states having a infection on the 2nd digit toe L foot since April, pt report going to the podiatrist, recently pt went to see a foot specialist who prescribed antibiotics x two weeks, finished on Wed, additionally pt had an MRI showing bone infection on the tip of the 2nd digit, reason why the pt was sent to the hospital for surgery.  Pt denies SOB, chest pain, dizziness, N/V/D, urinary symptoms, fevers, chills.  On assessment well appearing, airway is patent, breathing spontaneously and unlabored. Skin is dry, warm and color appropriate to race.  Abdomen is soft, no distended, no tender. Full ROM in all extremities.  Patient undressed and placed into gown, side rails up with bed locked and in lowest position for safety. call bell within reach. [place] provided. Comfort and safety provided. Educated not to ambulate without assistance. will continue to reassess.

## 2021-12-12 NOTE — ED PROVIDER NOTE - CLINICAL SUMMARY MEDICAL DECISION MAKING FREE TEXT BOX
61 year old male with osteomyelitis of left 2nd digit identified on outpatient MRI sent in by podiatrist with plan for surgery. Ordered pre-op labs and seen by podiatry. Plan for OR tomorrow. Podiatry requesting admission to medicine.

## 2021-12-12 NOTE — ED PROVIDER NOTE - NS ED ROS FT
CONSTITUTIONAL: Negative for fever, chills, fatigue, recent weight changes  ENT/MOUTH: Negative for hearing difficulties, ear pain, sore throat, rhinorrhea  EYES: Negative for eye pain, vision changes  CARDIOVASCULAR: Negative for chest pain, palpitations, claudication, edema  RESPIRATORY: Negative for respiratory distress, cough, dyspnea  GASTROINTESTINAL: Negative for nausea, vomiting, diarrhea, constipation  GENITOURINARY: Negative for dysuria, decreased urinary frequency, hematuria  SKIN/MUSCULOSKELETAL: As per HPI  NEUROLOGICAL: Negative for headache, weakness, numbness, paresthesias  PSYCHIATRIC: Negative for depression, anxiety  HEME/LYMPH: Negative for bruising, easy bleeding, lymphadenopathy  ENDOCRINE: Negative for polyuria, polydipsia, temperature intolerance

## 2021-12-13 ENCOUNTER — RESULT REVIEW (OUTPATIENT)
Age: 61
End: 2021-12-13

## 2021-12-13 ENCOUNTER — TRANSCRIPTION ENCOUNTER (OUTPATIENT)
Age: 61
End: 2021-12-13

## 2021-12-13 LAB
ALBUMIN SERPL ELPH-MCNC: 4.1 G/DL — SIGNIFICANT CHANGE UP (ref 3.3–5)
ALP SERPL-CCNC: 78 U/L — SIGNIFICANT CHANGE UP (ref 40–120)
ALT FLD-CCNC: 19 U/L — SIGNIFICANT CHANGE UP (ref 10–45)
ANION GAP SERPL CALC-SCNC: 14 MMOL/L — SIGNIFICANT CHANGE UP (ref 5–17)
AST SERPL-CCNC: 21 U/L — SIGNIFICANT CHANGE UP (ref 10–40)
BILIRUB SERPL-MCNC: 0.5 MG/DL — SIGNIFICANT CHANGE UP (ref 0.2–1.2)
BUN SERPL-MCNC: 12 MG/DL — SIGNIFICANT CHANGE UP (ref 7–23)
CALCIUM SERPL-MCNC: 9.8 MG/DL — SIGNIFICANT CHANGE UP (ref 8.4–10.5)
CHLORIDE SERPL-SCNC: 96 MMOL/L — SIGNIFICANT CHANGE UP (ref 96–108)
CO2 SERPL-SCNC: 23 MMOL/L — SIGNIFICANT CHANGE UP (ref 22–31)
CREAT SERPL-MCNC: 0.86 MG/DL — SIGNIFICANT CHANGE UP (ref 0.5–1.3)
GLUCOSE SERPL-MCNC: 98 MG/DL — SIGNIFICANT CHANGE UP (ref 70–99)
HCT VFR BLD CALC: 37.4 % — LOW (ref 39–50)
HGB BLD-MCNC: 13 G/DL — SIGNIFICANT CHANGE UP (ref 13–17)
INR BLD: 0.94 RATIO — SIGNIFICANT CHANGE UP (ref 0.88–1.16)
MCHC RBC-ENTMCNC: 33.5 PG — SIGNIFICANT CHANGE UP (ref 27–34)
MCHC RBC-ENTMCNC: 34.8 GM/DL — SIGNIFICANT CHANGE UP (ref 32–36)
MCV RBC AUTO: 96.4 FL — SIGNIFICANT CHANGE UP (ref 80–100)
NRBC # BLD: 0 /100 WBCS — SIGNIFICANT CHANGE UP (ref 0–0)
PLATELET # BLD AUTO: 231 K/UL — SIGNIFICANT CHANGE UP (ref 150–400)
POTASSIUM SERPL-MCNC: 3.7 MMOL/L — SIGNIFICANT CHANGE UP (ref 3.5–5.3)
POTASSIUM SERPL-SCNC: 3.7 MMOL/L — SIGNIFICANT CHANGE UP (ref 3.5–5.3)
PROT SERPL-MCNC: 7.3 G/DL — SIGNIFICANT CHANGE UP (ref 6–8.3)
PROTHROM AB SERPL-ACNC: 11.3 SEC — SIGNIFICANT CHANGE UP (ref 10.6–13.6)
RBC # BLD: 3.88 M/UL — LOW (ref 4.2–5.8)
RBC # FLD: 11.9 % — SIGNIFICANT CHANGE UP (ref 10.3–14.5)
SODIUM SERPL-SCNC: 133 MMOL/L — LOW (ref 135–145)
SPECIMEN SOURCE: SIGNIFICANT CHANGE UP
WBC # BLD: 6.48 K/UL — SIGNIFICANT CHANGE UP (ref 3.8–10.5)
WBC # FLD AUTO: 6.48 K/UL — SIGNIFICANT CHANGE UP (ref 3.8–10.5)

## 2021-12-13 PROCEDURE — 88305 TISSUE EXAM BY PATHOLOGIST: CPT | Mod: 26

## 2021-12-13 PROCEDURE — 73630 X-RAY EXAM OF FOOT: CPT | Mod: 26,LT

## 2021-12-13 PROCEDURE — 88311 DECALCIFY TISSUE: CPT | Mod: 26

## 2021-12-13 PROCEDURE — 88304 TISSUE EXAM BY PATHOLOGIST: CPT | Mod: 26

## 2021-12-13 RX ORDER — FUROSEMIDE 40 MG
20 TABLET ORAL DAILY
Refills: 0 | Status: DISCONTINUED | OUTPATIENT
Start: 2021-12-13 | End: 2021-12-14

## 2021-12-13 RX ORDER — FENTANYL CITRATE 50 UG/ML
25 INJECTION INTRAVENOUS
Refills: 0 | Status: DISCONTINUED | OUTPATIENT
Start: 2021-12-13 | End: 2021-12-13

## 2021-12-13 RX ORDER — FENTANYL CITRATE 50 UG/ML
50 INJECTION INTRAVENOUS
Refills: 0 | Status: DISCONTINUED | OUTPATIENT
Start: 2021-12-13 | End: 2021-12-13

## 2021-12-13 RX ORDER — LOSARTAN POTASSIUM 100 MG/1
50 TABLET, FILM COATED ORAL DAILY
Refills: 0 | Status: DISCONTINUED | OUTPATIENT
Start: 2021-12-13 | End: 2021-12-14

## 2021-12-13 RX ORDER — AMLODIPINE BESYLATE 2.5 MG/1
5 TABLET ORAL DAILY
Refills: 0 | Status: DISCONTINUED | OUTPATIENT
Start: 2021-12-13 | End: 2021-12-14

## 2021-12-13 RX ORDER — HEPARIN SODIUM 5000 [USP'U]/ML
5000 INJECTION INTRAVENOUS; SUBCUTANEOUS EVERY 12 HOURS
Refills: 0 | Status: DISCONTINUED | OUTPATIENT
Start: 2021-12-13 | End: 2021-12-14

## 2021-12-13 RX ORDER — ONDANSETRON 8 MG/1
4 TABLET, FILM COATED ORAL EVERY 4 HOURS
Refills: 0 | Status: DISCONTINUED | OUTPATIENT
Start: 2021-12-13 | End: 2021-12-13

## 2021-12-13 RX ORDER — INFLUENZA VIRUS VACCINE 15; 15; 15; 15 UG/.5ML; UG/.5ML; UG/.5ML; UG/.5ML
0.5 SUSPENSION INTRAMUSCULAR ONCE
Refills: 0 | Status: DISCONTINUED | OUTPATIENT
Start: 2021-12-13 | End: 2021-12-14

## 2021-12-13 RX ORDER — ASPIRIN/CALCIUM CARB/MAGNESIUM 324 MG
81 TABLET ORAL DAILY
Refills: 0 | Status: DISCONTINUED | OUTPATIENT
Start: 2021-12-13 | End: 2021-12-14

## 2021-12-13 RX ORDER — CEFEPIME 1 G/1
1000 INJECTION, POWDER, FOR SOLUTION INTRAMUSCULAR; INTRAVENOUS EVERY 8 HOURS
Refills: 0 | Status: DISCONTINUED | OUTPATIENT
Start: 2021-12-13 | End: 2021-12-14

## 2021-12-13 RX ADMIN — HEPARIN SODIUM 5000 UNIT(S): 5000 INJECTION INTRAVENOUS; SUBCUTANEOUS at 18:26

## 2021-12-13 RX ADMIN — CEFEPIME 100 MILLIGRAM(S): 1 INJECTION, POWDER, FOR SOLUTION INTRAMUSCULAR; INTRAVENOUS at 05:59

## 2021-12-13 RX ADMIN — LOSARTAN POTASSIUM 50 MILLIGRAM(S): 100 TABLET, FILM COATED ORAL at 05:58

## 2021-12-13 RX ADMIN — Medication 20 MILLIGRAM(S): at 05:59

## 2021-12-13 RX ADMIN — AMLODIPINE BESYLATE 5 MILLIGRAM(S): 2.5 TABLET ORAL at 05:59

## 2021-12-13 RX ADMIN — Medication 81 MILLIGRAM(S): at 11:39

## 2021-12-13 RX ADMIN — CEFEPIME 100 MILLIGRAM(S): 1 INJECTION, POWDER, FOR SOLUTION INTRAMUSCULAR; INTRAVENOUS at 15:17

## 2021-12-13 NOTE — PRE-ANESTHESIA EVALUATION ADULT - NSANTHVITALSIGNSFT_GEN_ALL_CORE
Vital Signs Last 24 Hrs  T(C): 36.7 (13 Dec 2021 00:00), Max: 36.8 (12 Dec 2021 16:53)  T(F): 98.1 (13 Dec 2021 00:00), Max: 98.2 (12 Dec 2021 16:53)  HR: 96 (13 Dec 2021 00:00) (87 - 96)  BP: 134/91 (13 Dec 2021 00:00) (129/97 - 159/99)  BP(mean): 107 (12 Dec 2021 16:53) (107 - 112)  RR: 19 (13 Dec 2021 00:00) (18 - 20)  SpO2: 96% (13 Dec 2021 00:00) (96% - 100%)

## 2021-12-13 NOTE — DISCHARGE NOTE PROVIDER - NSDCFUADDAPPT_GEN_ALL_CORE_FT
Podiatry Discharge Instructions:  Follow up: Please follow up with Dr. Sharon Bermudez within 1 week of discharge from the hospital, please call [phone] for appointment and discuss that you recently were seen in the hospital.  Wound Care: Please leave your dressing clean dry intact until your follow up appointment.  Weight bearing: Please weight bear as tolerated in a surgical shoe.  Antibiotics: Please continue as instructed. Podiatry Discharge Instructions:  Follow up: Please follow up with Dr. Sharon Bermudez within 1 week of discharge from the hospital, please call [phone] for appointment and discuss that you recently were seen in the hospital.  Wound Care: Please leave your dressing clean dry intact until your follow up appointment.  Weight bearing: Please weight bear as tolerated in a surgical shoe.  Antibiotics: Please continue as instructed.  Download FollowHealth

## 2021-12-13 NOTE — PROGRESS NOTE ADULT - ASSESSMENT
60M    h/o HTN  sent to er  for  osteomyelitis, of  left  3rd  toe/ afberile/  normal  wbc  mri on 9/22,  osteo  of  left   3rd toe    s/p   Excisional debridement of L foot distal digital wound on 9/23  s/p  amputation of 3rd Left toe on 10/12        * admitted  for  osteo, Left foot, 2nd toe  on iv cefepime  mri foot, ulcer of  2nd  toe/ osteo, no colection   ID eval  seen by podiatry    * HTN, on norvasc/  cozaar  card dr membreno, known to pt  on dvt ppx  plan, OR today  pt cleraed for surgery/ spoke  with podiatry  resident         rad< from: MR Foot w/wo IV Cont, Left (12.03.21 @ 15:58) >  mpression:  Soft tissue ulcer along the distal aspect of the second toe with findings   concerning for osteomyelitis of the second distal phalanx. No drainable   fluid collection seenon postcontrast images.    Chronic appearing fragmentation of the fibular hallux sesamoid, which may   represent a bipartite fibular hallux sesamoid or be related to a chronic   fracture deformity. Osseous edema within the fibular hallux sesamoid,   which may be stress related or be related to degenerative changes.  Other findings as above.  COMMUNICATION: Findings were sent via [employer] email to Dr. Bermudez at the time of this dictation.  < end of copied text >           mr< from: MR Foot w/wo IV Cont, Left (09.22.20 @ 11:58) >  IMPRESSION:  1. Osteomyelitis of the third distal phalanx with faint edema of the third middle phalanx that may be reactive or reflect additional infection.  2. No osteomyelitis of the second toe.  3. An email was sent to Dr. SHIRLENE TELLO on 9/22/2020 12:48 PM.  < end of copied text

## 2021-12-13 NOTE — DISCHARGE NOTE PROVIDER - HOSPITAL COURSE
Pt is 60yo w/ s/p LF partial 2nd toe amp DOS 12/13  Afebrile, no leukocytosis  POD 1: s/p LF partial 2nd toe amputation, sutures well coapted, no drainage, no erythema, no local signs of infection   LFXR demonstrating OM of LF 2nd toe  Pod stable for discharge on 1 week PO Augmentin  Pt to keep dressing clean, dry and intact until follow up  DCP with med rec discussed with Vik PT cleared from podiatry to follow up with Dr Barger

## 2021-12-13 NOTE — DISCHARGE NOTE PROVIDER - NSDCCPCAREPLAN_GEN_ALL_CORE_FT
PRINCIPAL DISCHARGE DIAGNOSIS  Diagnosis: Acute osteomyelitis  Assessment and Plan of Treatment: S/P s/p LF partial 2nd toe amputation, sutures well coapted, no drainage, no erythema, no local signs of infection   Complete dose antibiotic   Follow up with Dr Barger

## 2021-12-13 NOTE — PROGRESS NOTE ADULT - ASSESSMENT
Plan:   Pt is scheduled for LF partial 2nd ray with Dr. Bermudez at 7:30am. Patient is aware of procedure and is NPO since midnight.  CXR on sunrise.  EKG on sunrise.  Medical clearance to be documented today- just discussed w/ PA.  Consent signed and in chart.  Procedure was explained to patient in detail. All alternatives, risks and complications were discussed. All questions answered.

## 2021-12-13 NOTE — DISCHARGE NOTE PROVIDER - CARE PROVIDER_API CALL
Sharon Bermudez (DPM)  Surgery  [address], [place] [address]  Phone: [phone]  Fax: [phone]  Follow Up Time:

## 2021-12-13 NOTE — DISCHARGE NOTE PROVIDER - NSDCFUADDINST_GEN_ALL_CORE_FT
Podiatry Discharge Instructions:  Follow up: Please follow up with Dr. Sharon Bermudez within 1 week of discharge from the hospital, please call [phone] for appointment and discuss that you recently were seen in the hospital.  Wound Care: Please leave your dressing clean dry intact until your follow up appointment.  Weight bearing: Please weight bear as tolerated in a surgical shoe.  Antibiotics: Please continue as instructed.

## 2021-12-13 NOTE — PROGRESS NOTE ADULT - ATTENDING COMMENTS
Medical clearance to be documented today- just discussed w/ Dr. Coronel  Consent signed and in chart.  Procedure was explained to patient in detail. All alternatives, risks and complications were discussed. All questions answered.

## 2021-12-13 NOTE — BRIEF OPERATIVE NOTE - COMMENTS
Pt is s/p L foot partial 2nd toe amputation  - No purulence, dense bone stock at level of resection, low concern for residual infection  - Adequate intraoperative bleeding, low concern for viability  - Pod to assess appearance of surgical site 12/14 AM, if viable will rec d/c

## 2021-12-13 NOTE — DISCHARGE NOTE PROVIDER - NSDCMRMEDTOKEN_GEN_ALL_CORE_FT
amLODIPine 5 mg oral tablet: 1 tab(s) orally once a day  Aspirin Enteric Coated 81 mg oral delayed release tablet: 1 tab(s) orally once a day  losartan 50 mg oral tablet: 1 tab(s) orally once a day  multivitamin: 1 tab(s) orally once a day   amLODIPine 5 mg oral tablet: 1 tab(s) orally once a day  Aspirin Enteric Coated 81 mg oral delayed release tablet: 1 tab(s) orally once a day  Bactrim 400 mg-80 mg oral tablet: 1 milligram(s) orally 2 times a day   losartan 50 mg oral tablet: 1 tab(s) orally once a day  multivitamin: 1 tab(s) orally once a day

## 2021-12-13 NOTE — BRIEF OPERATIVE NOTE - SPECIMENS
Pathology: L second toe dirty bone and soft tissue, L second toe clean bone margin Micro: L foot deep wound culture, L second toe clean bone margin

## 2021-12-13 NOTE — BRIEF OPERATIVE NOTE - OPERATION/FINDINGS
Disarticulation of L foot 2nd toe at PIPJ, no purulence at amputation site. Soft tissue structures appeared viable and non-infected. Dense bone stock at level of resection of 2nd proximal phalanx head. Low concern for viability, low concern for residual infection. EBL 20cc.

## 2021-12-13 NOTE — PATIENT PROFILE ADULT - FALL HARM RISK - HARM RISK INTERVENTIONS

## 2021-12-14 ENCOUNTER — TRANSCRIPTION ENCOUNTER (OUTPATIENT)
Age: 61
End: 2021-12-14

## 2021-12-14 VITALS — OXYGEN SATURATION: 99 % | DIASTOLIC BLOOD PRESSURE: 84 MMHG | HEART RATE: 102 BPM | SYSTOLIC BLOOD PRESSURE: 114 MMHG

## 2021-12-14 LAB
ANION GAP SERPL CALC-SCNC: 13 MMOL/L — SIGNIFICANT CHANGE UP (ref 5–17)
BUN SERPL-MCNC: 15 MG/DL — SIGNIFICANT CHANGE UP (ref 7–23)
CALCIUM SERPL-MCNC: 9.8 MG/DL — SIGNIFICANT CHANGE UP (ref 8.4–10.5)
CHLORIDE SERPL-SCNC: 96 MMOL/L — SIGNIFICANT CHANGE UP (ref 96–108)
CO2 SERPL-SCNC: 24 MMOL/L — SIGNIFICANT CHANGE UP (ref 22–31)
CREAT SERPL-MCNC: 0.9 MG/DL — SIGNIFICANT CHANGE UP (ref 0.5–1.3)
GLUCOSE SERPL-MCNC: 96 MG/DL — SIGNIFICANT CHANGE UP (ref 70–99)
HCT VFR BLD CALC: 37.5 % — LOW (ref 39–50)
HGB BLD-MCNC: 13 G/DL — SIGNIFICANT CHANGE UP (ref 13–17)
MCHC RBC-ENTMCNC: 33.6 PG — SIGNIFICANT CHANGE UP (ref 27–34)
MCHC RBC-ENTMCNC: 34.7 GM/DL — SIGNIFICANT CHANGE UP (ref 32–36)
MCV RBC AUTO: 96.9 FL — SIGNIFICANT CHANGE UP (ref 80–100)
NRBC # BLD: 0 /100 WBCS — SIGNIFICANT CHANGE UP (ref 0–0)
PLATELET # BLD AUTO: 242 K/UL — SIGNIFICANT CHANGE UP (ref 150–400)
POTASSIUM SERPL-MCNC: 4.1 MMOL/L — SIGNIFICANT CHANGE UP (ref 3.5–5.3)
POTASSIUM SERPL-SCNC: 4.1 MMOL/L — SIGNIFICANT CHANGE UP (ref 3.5–5.3)
RBC # BLD: 3.87 M/UL — LOW (ref 4.2–5.8)
RBC # FLD: 11.9 % — SIGNIFICANT CHANGE UP (ref 10.3–14.5)
SODIUM SERPL-SCNC: 133 MMOL/L — LOW (ref 135–145)
WBC # BLD: 7.99 K/UL — SIGNIFICANT CHANGE UP (ref 3.8–10.5)
WBC # FLD AUTO: 7.99 K/UL — SIGNIFICANT CHANGE UP (ref 3.8–10.5)

## 2021-12-14 PROCEDURE — 73630 X-RAY EXAM OF FOOT: CPT

## 2021-12-14 PROCEDURE — 87186 SC STD MICRODIL/AGAR DIL: CPT

## 2021-12-14 PROCEDURE — 86850 RBC ANTIBODY SCREEN: CPT

## 2021-12-14 PROCEDURE — 80048 BASIC METABOLIC PNL TOTAL CA: CPT

## 2021-12-14 PROCEDURE — 86140 C-REACTIVE PROTEIN: CPT

## 2021-12-14 PROCEDURE — 71045 X-RAY EXAM CHEST 1 VIEW: CPT

## 2021-12-14 PROCEDURE — 99222 1ST HOSP IP/OBS MODERATE 55: CPT

## 2021-12-14 PROCEDURE — U0005: CPT

## 2021-12-14 PROCEDURE — 84100 ASSAY OF PHOSPHORUS: CPT

## 2021-12-14 PROCEDURE — 99285 EMERGENCY DEPT VISIT HI MDM: CPT

## 2021-12-14 PROCEDURE — 86900 BLOOD TYPING SEROLOGIC ABO: CPT

## 2021-12-14 PROCEDURE — 87077 CULTURE AEROBIC IDENTIFY: CPT

## 2021-12-14 PROCEDURE — 85652 RBC SED RATE AUTOMATED: CPT

## 2021-12-14 PROCEDURE — 80053 COMPREHEN METABOLIC PANEL: CPT

## 2021-12-14 PROCEDURE — 87075 CULTR BACTERIA EXCEPT BLOOD: CPT

## 2021-12-14 PROCEDURE — 88304 TISSUE EXAM BY PATHOLOGIST: CPT

## 2021-12-14 PROCEDURE — 86901 BLOOD TYPING SEROLOGIC RH(D): CPT

## 2021-12-14 PROCEDURE — 88305 TISSUE EXAM BY PATHOLOGIST: CPT

## 2021-12-14 PROCEDURE — 85730 THROMBOPLASTIN TIME PARTIAL: CPT

## 2021-12-14 PROCEDURE — U0003: CPT

## 2021-12-14 PROCEDURE — 88311 DECALCIFY TISSUE: CPT

## 2021-12-14 PROCEDURE — 87070 CULTURE OTHR SPECIMN AEROBIC: CPT

## 2021-12-14 PROCEDURE — 97161 PT EVAL LOW COMPLEX 20 MIN: CPT

## 2021-12-14 PROCEDURE — 83735 ASSAY OF MAGNESIUM: CPT

## 2021-12-14 PROCEDURE — 85027 COMPLETE CBC AUTOMATED: CPT

## 2021-12-14 PROCEDURE — 85610 PROTHROMBIN TIME: CPT

## 2021-12-14 PROCEDURE — 85025 COMPLETE CBC W/AUTO DIFF WBC: CPT

## 2021-12-14 RX ADMIN — AMLODIPINE BESYLATE 5 MILLIGRAM(S): 2.5 TABLET ORAL at 06:52

## 2021-12-14 RX ADMIN — LOSARTAN POTASSIUM 50 MILLIGRAM(S): 100 TABLET, FILM COATED ORAL at 06:52

## 2021-12-14 RX ADMIN — CEFEPIME 100 MILLIGRAM(S): 1 INJECTION, POWDER, FOR SOLUTION INTRAMUSCULAR; INTRAVENOUS at 06:52

## 2021-12-14 RX ADMIN — HEPARIN SODIUM 5000 UNIT(S): 5000 INJECTION INTRAVENOUS; SUBCUTANEOUS at 06:52

## 2021-12-14 RX ADMIN — Medication 81 MILLIGRAM(S): at 12:29

## 2021-12-14 RX ADMIN — Medication 20 MILLIGRAM(S): at 06:52

## 2021-12-14 NOTE — DISCHARGE NOTE NURSING/CASE MANAGEMENT/SOCIAL WORK - NSDCPEFALRISK_GEN_ALL_CORE
For information on Fall & Injury Prevention, visit: [email or web address] OR  [email or web address] OR  https://www.cdc.gov/steadi/patient.html

## 2021-12-14 NOTE — PHYSICAL THERAPY INITIAL EVALUATION ADULT - LIVES WITH, PROFILE
Pt resides with spouse in private home with 4 steps to enter and UHR assist, 1 FOS inside however, able to stay on first floor. Pt reports owning RW/cane from prior hip surgery. Pt functionally independent in all aspects of functional mobility and self care PTA without DME/AD./spouse

## 2021-12-14 NOTE — PHYSICAL THERAPY INITIAL EVALUATION ADULT - PLANNED THERAPY INTERVENTIONS, PT EVAL
STAIR GOAL: Pt will negotiate 1 FOS independently with a step-over-step reciprocal gait pattern and HR assist as needed within 2 weeks./balance training/gait training

## 2021-12-14 NOTE — PHYSICAL THERAPY INITIAL EVALUATION ADULT - GAIT TRAINING, PT EVAL
GOAL: Pt will ambulate at least 550' independently with Good balance and appropriate nicole within 2 wks.

## 2021-12-14 NOTE — PHYSICAL THERAPY INITIAL EVALUATION ADULT - GENERAL OBSERVATIONS, REHAB EVAL
Pt received Pt received sitting EOB in care of Med NP Albina in NAD, +IVL, +ace bandage/gauze L foot, VSS, agreeable to participate in therapy at this time

## 2021-12-14 NOTE — DISCHARGE NOTE NURSING/CASE MANAGEMENT/SOCIAL WORK - NSDCFUADDAPPT_GEN_ALL_CORE_FT
Podiatry Discharge Instructions:  Follow up: Please follow up with Dr. Sharon Bermudez within 1 week of discharge from the hospital, please call [phone] for appointment and discuss that you recently were seen in the hospital.  Wound Care: Please leave your dressing clean dry intact until your follow up appointment.  Weight bearing: Please weight bear as tolerated in a surgical shoe.  Antibiotics: Please continue as instructed.  Download FollowHealth

## 2021-12-14 NOTE — PROGRESS NOTE ADULT - ASSESSMENT
60M    h/o HTN  sent to er  for  osteomyelitis, of  left  3rd  toe/ afberile/  normal  wbc  mri on 9/22,  osteo  of  left   3rd toe    s/p   Excisional debridement of L foot distal digital wound on 9/23  s/p  amputation of 3rd Left toe on 10/12        * admitted  for  osteo, Left foot, 2nd toe  on iv cefepime  mri foot, ulcer of  2nd  toe/ osteo, no colection   ID eval  seen by podiatry    * HTN, on norvasc/  cozaar  s/p 2nd  toe, partial amputation  awiat  rec  from ID on duration of  ab   team  awrae    pt  to f/p with poditrist         rad< from: MR Foot w/wo IV Cont, Left (12.03.21 @ 15:58) >  mpression:  Soft tissue ulcer along the distal aspect of the second toe with findings   concerning for osteomyelitis of the second distal phalanx. No drainable   fluid collection seenon postcontrast images.    Chronic appearing fragmentation of the fibular hallux sesamoid, which may   represent a bipartite fibular hallux sesamoid or be related to a chronic   fracture deformity. Osseous edema within the fibular hallux sesamoid,   which may be stress related or be related to degenerative changes.  Other findings as above.  COMMUNICATION: Findings were sent via [employer] email to Dr. Bermudez at the time of this dictation.  < end of copied text >           mr< from: MR Foot w/wo IV Cont, Left (09.22.20 @ 11:58) >  IMPRESSION:  1. Osteomyelitis of the third distal phalanx with faint edema of the third middle phalanx that may be reactive or reflect additional infection.  2. No osteomyelitis of the second toe.  3. An email was sent to Dr. SHIRLENE TELLO on 9/22/2020 12:48 PM.  < end of copied text  60M    h/o HTN  sent to er  for  osteomyelitis, of  left  3rd  toe/ afberile/  normal  wbc  mri on 9/22,  osteo  of  left   3rd toe    s/p   Excisional debridement of L foot distal digital wound on 9/23  s/p  amputation of 3rd Left toe on 10/12        * admitted  for  osteo, Left foot, 2nd toe  on iv cefepime  mri foot, ulcer of  2nd  toe/ osteo, no colection   ID eval called  seen by podiatry    * HTN, on norvasc/  cozaar  s/p 2nd  toe, partial amputation  per  podiatry, sugmentin  for  1  week   team  awrae    pt  to f/p with podiatrist         rad< from: MR Foot w/wo IV Cont, Left (12.03.21 @ 15:58) >  mpression:  Soft tissue ulcer along the distal aspect of the second toe with findings   concerning for osteomyelitis of the second distal phalanx. No drainable   fluid collection seenon postcontrast images.    Chronic appearing fragmentation of the fibular hallux sesamoid, which may   represent a bipartite fibular hallux sesamoid or be related to a chronic   fracture deformity. Osseous edema within the fibular hallux sesamoid,   which may be stress related or be related to degenerative changes.  Other findings as above.  COMMUNICATION: Findings were sent via [employer] email to Dr. Bermudez at the time of this dictation.  < end of copied text >           mr< from: MR Foot w/wo IV Cont, Left (09.22.20 @ 11:58) >  IMPRESSION:  1. Osteomyelitis of the third distal phalanx with faint edema of the third middle phalanx that may be reactive or reflect additional infection.  2. No osteomyelitis of the second toe.  3. An email was sent to Dr. SHIRLENE TELLO on 9/22/2020 12:48 PM.  < end of copied text

## 2021-12-14 NOTE — CONSULT NOTE ADULT - ASSESSMENT
Pt is 62yo w/ LF 2nd toe wound to bone  -pt seen and examined  -Afebrile, no leukocytosis  -LF plantar distal 2nd toe wound to bone, 2nd digit dactylitis, no erythema, no drainage, no malodor  -LFXR demonstrating OM of LF 2nd toe  -Outpatient MRI positive for OM 2nd digit  -Recommend admission under medicine, under Dr. Neelam Chery  -Rec Vanco/ Cefepime  -Pt to remain NPO starting at midnight tonight, pt scheduled for left foot 2nd toe amp Mon 12/13 at 7:30am  -Please document medical clearance/ optimization for LF surgery under local and light sedation   -Discussed w/ attending
61M with history of hypertension.  Last year developed L foot 3rd toe OM that was treated with surgical resection for chronic OM.  This time, he was admitted for treatment of non-healing Left foot 2nd toe ulcer since April 2021.  Was treated wiht debridement as outpatient.  He did receive oral bactrim for 2 weeks (last dose was 12/8). Then, MRI obtained as an outpatient and showed OM.  Was referred for admission for surgical management.  Underwent toe resection 12/13.  Podiatry notes suggest low likelihood of residual infection.  He has been on vancomycin x1 and cefepime.  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    Likely chronic OM s/p surgical resection  - podiatry feels low likelihood of residual infection  - agree with short post-op course  - would use bactrim given history of MRSA  - he will f/u with his podiatrist    I have discussed plan of care as detailed above with NP

## 2021-12-14 NOTE — PROGRESS NOTE ADULT - SUBJECTIVE AND OBJECTIVE BOX
afebrile    REVIEW OF SYSTEMS:  GEN: no fever,    no chills  RESP: no SOB,   no cough  CVS: no chest pain,   no palpitations  GI: no abdominal pain,   no nausea,   no vomiting,   no constipation,   no diarrhea  : no dysuria,   no frequency  NEURO: no headache,   no dizziness  PSYCH: no depression,   not anxious  Derm : no rash    MEDICATIONS  (STANDING):  amLODIPine   Tablet 5 milliGRAM(s) Oral daily  aspirin enteric coated 81 milliGRAM(s) Oral daily  cefepime   IVPB 1000 milliGRAM(s) IV Intermittent every 8 hours  furosemide    Tablet 20 milliGRAM(s) Oral daily  heparin   Injectable 5000 Unit(s) SubCutaneous every 12 hours  influenza   Vaccine 0.5 milliLiter(s) IntraMuscular once  losartan 50 milliGRAM(s) Oral daily    MEDICATIONS  (PRN):      Vital Signs Last 24 Hrs  T(C): 36.7 (13 Dec 2021 00:00), Max: 36.8 (12 Dec 2021 16:53)  T(F): 98.1 (13 Dec 2021 00:00), Max: 98.2 (12 Dec 2021 16:53)  HR: 96 (13 Dec 2021 00:00) (87 - 96)  BP: 134/91 (13 Dec 2021 00:00) (129/97 - 159/99)  BP(mean): 107 (12 Dec 2021 16:53) (107 - 112)  RR: 19 (13 Dec 2021 00:00) (18 - 20)  SpO2: 96% (13 Dec 2021 00:00) (96% - 100%)  CAPILLARY BLOOD GLUCOSE        I&O's Summary      PHYSICAL EXAM:  HEAD:  Atraumatic, Normocephalic  NECK: Supple, No   JVD  CHEST/LUNG:   no     rales,     no,    rhonchi  HEART: Regular rate and rhythm;         murmur  ABDOMEN: Soft, Nontender, ;   EXTREMITIES:      dressing  NEUROLOGY:  alert    LABS:                        13.0   6.48  )-----------( 231      ( 13 Dec 2021 06:26 )             37.4     12-13    133<L>  |  96  |  12  ----------------------------<  98  3.7   |  23  |  0.86    Ca    9.8      13 Dec 2021 06:26  Phos  2.6     12-12  Mg     1.8     12-12    TPro  7.3  /  Alb  4.1  /  TBili  0.5  /  DBili  x   /  AST  21  /  ALT  19  /  AlkPhos  78  12-13    PT/INR - ( 13 Dec 2021 06:26 )   PT: 11.3 sec;   INR: 0.94 ratio         PTT - ( 12 Dec 2021 11:29 )  PTT:32.6 sec                        Consultant(s) Notes Reviewed:      Care Discussed with Consultants/Other Providers:    
Patient is a 61y old  Male who presents with a chief complaint of      INTERVAL HPI/OVERNIGHT EVENTS:  Patient seen and evaluated at bedside.  Pt is resting comfortable in NAD. Denies N/V/F/C.      Allergies    hydrochlorothiazide (Other)  penicillin (Unknown)    Intolerances        Vital Signs Last 24 Hrs  T(C): 36.9 (14 Dec 2021 06:50), Max: 36.9 (14 Dec 2021 06:50)  T(F): 98.5 (14 Dec 2021 06:50), Max: 98.5 (14 Dec 2021 06:50)  HR: 75 (14 Dec 2021 06:50) (60 - 78)  BP: 120/88 (14 Dec 2021 06:50) (102/61 - 138/90)  BP(mean): 83 (13 Dec 2021 11:00) (77 - 96)  RR: 18 (14 Dec 2021 06:50) (15 - 18)  SpO2: 98% (14 Dec 2021 06:50) (94% - 99%)    LABS:                        13.0   7.99  )-----------( 242      ( 14 Dec 2021 07:14 )             37.5     12-14    133<L>  |  96  |  15  ----------------------------<  96  4.1   |  24  |  0.90    Ca    9.8      14 Dec 2021 07:14  Phos  2.6     12-12  Mg     1.8     12-12    TPro  7.3  /  Alb  4.1  /  TBili  0.5  /  DBili  x   /  AST  21  /  ALT  19  /  AlkPhos  78  12-13    PT/INR - ( 13 Dec 2021 06:26 )   PT: 11.3 sec;   INR: 0.94 ratio         PTT - ( 12 Dec 2021 11:29 )  PTT:32.6 sec    CAPILLARY BLOOD GLUCOSE          Lower Extremity Physical Exam:  Vasular: DP/PT 2/4, B/L, CFT <3 seconds B/L, Temperature gradient WNL, B/L.   Neuro: Epicritic sensation diminshed to the level of digits, B/L.  Musculoskeletal/Ortho: LF s/p 3rd toe amputation healed  Skin: POD 1: s/p LF partial 2nd toe amputation, sutures well coapted, no drainage, no erythema, no local signs of infection     RADIOLOGY & ADDITIONAL TESTS:  
Patient is a 61y old  Male who presents with a chief complaint of     INTERVAL HPI/OVERNIGHT EVENTS:   Pt is scheduled for LF partial 2nd ray with Dr. Bermudez at 7:30am. Patient is aware of procedure and is NPO since midnight.    MEDICATIONS  (STANDING):  amLODIPine   Tablet 5 milliGRAM(s) Oral daily  aspirin enteric coated 81 milliGRAM(s) Oral daily  cefepime   IVPB 1000 milliGRAM(s) IV Intermittent every 8 hours  furosemide    Tablet 20 milliGRAM(s) Oral daily  heparin   Injectable 5000 Unit(s) SubCutaneous every 12 hours  influenza   Vaccine 0.5 milliLiter(s) IntraMuscular once  losartan 50 milliGRAM(s) Oral daily    MEDICATIONS  (PRN):      Allergies    hydrochlorothiazide (Other)  penicillin (Unknown)    Intolerances        Vital Signs Last 24 Hrs  T(C): 36.7 (13 Dec 2021 00:00), Max: 36.8 (12 Dec 2021 16:53)  T(F): 98.1 (13 Dec 2021 00:00), Max: 98.2 (12 Dec 2021 16:53)  HR: 96 (13 Dec 2021 00:00) (87 - 96)  BP: 134/91 (13 Dec 2021 00:00) (129/97 - 159/99)  BP(mean): 107 (12 Dec 2021 16:53) (107 - 112)  RR: 19 (13 Dec 2021 00:00) (18 - 20)  SpO2: 96% (13 Dec 2021 00:00) (96% - 100%)    LABS:                        13.0   6.48  )-----------( 231      ( 13 Dec 2021 06:26 )             37.4     12-12    132<L>  |  97  |  9   ----------------------------<  74  4.6   |  21<L>  |  0.91    Ca    9.3      12 Dec 2021 11:29  Phos  2.6     12-12  Mg     1.8     12-12    TPro  7.8  /  Alb  4.1  /  TBili  0.2  /  DBili  x   /  AST  28  /  ALT  21  /  AlkPhos  78  12-12    PT/INR - ( 13 Dec 2021 06:26 )   PT: 11.3 sec;   INR: 0.94 ratio         PTT - ( 12 Dec 2021 11:29 )  PTT:32.6 sec    CAPILLARY BLOOD GLUCOSE          RADIOLOGY & ADDITIONAL TESTS:    
    afebrile  REVIEW OF SYSTEMS:  GEN: no fever,    no chills  RESP: no SOB,   no cough  CVS: no chest pain,   no palpitations  GI: no abdominal pain,   no nausea,   no vomiting,   no constipation,   no diarrhea  : no dysuria,   no frequency  NEURO: no headache,   no dizziness  PSYCH: no depression,   not anxious  Derm : no rash    MEDICATIONS  (STANDING):  amLODIPine   Tablet 5 milliGRAM(s) Oral daily  aspirin enteric coated 81 milliGRAM(s) Oral daily  cefepime   IVPB 1000 milliGRAM(s) IV Intermittent every 8 hours  furosemide    Tablet 20 milliGRAM(s) Oral daily  heparin   Injectable 5000 Unit(s) SubCutaneous every 12 hours  influenza   Vaccine 0.5 milliLiter(s) IntraMuscular once  losartan 50 milliGRAM(s) Oral daily    MEDICATIONS  (PRN):      Vital Signs Last 24 Hrs  T(C): 36.8 (14 Dec 2021 09:42), Max: 36.9 (14 Dec 2021 06:50)  T(F): 98.3 (14 Dec 2021 09:42), Max: 98.5 (14 Dec 2021 06:50)  HR: 102 (14 Dec 2021 09:54) (71 - 102)  BP: 114/84 (14 Dec 2021 09:54) (114/84 - 136/87)  BP(mean): --  RR: 18 (14 Dec 2021 09:42) (18 - 18)  SpO2: 99% (14 Dec 2021 09:54) (97% - 99%)  CAPILLARY BLOOD GLUCOSE        I&O's Summary    13 Dec 2021 07:01  -  14 Dec 2021 07:00  --------------------------------------------------------  IN: 900 mL / OUT: 0 mL / NET: 900 mL        PHYSICAL EXAM:  HEAD:  Atraumatic, Normocephalic  NECK: Supple, No   JVD  CHEST/LUNG:   no     rales,     no,    rhonchi  HEART: Regular rate and rhythm;         murmur  ABDOMEN: Soft, Nontender, ;   EXTREMITIES:   dressing  NEUROLOGY:  alert    LABS:                        13.0   7.99  )-----------( 242      ( 14 Dec 2021 07:14 )             37.5     12-14    133<L>  |  96  |  15  ----------------------------<  96  4.1   |  24  |  0.90    Ca    9.8      14 Dec 2021 07:14    TPro  7.3  /  Alb  4.1  /  TBili  0.5  /  DBili  x   /  AST  21  /  ALT  19  /  AlkPhos  78  12-13    PT/INR - ( 13 Dec 2021 06:26 )   PT: 11.3 sec;   INR: 0.94 ratio                               Culture - Tissue with Gram Stain (collected 12-13-21 @ 15:05)  Source: .Tissue LEFT 2ND TOE CLEAN BONE  Gram Stain (12-13-21 @ 22:56):    No polymorphonuclear leukocytes per low power field    No organisms seen per oil power field        Consultant(s) Notes Reviewed:      Care Discussed with Consultants/Other Providers:

## 2021-12-14 NOTE — PHYSICAL THERAPY INITIAL EVALUATION ADULT - ADDITIONAL COMMENTS
X-Ray L Foot 12/14/21 IMPRESSION: Expected postoperative appearance of second toe partial limitation.  MR L Foot 12/3/21: Soft tissue ulcer along the distal aspect of the second toe with findings  concerning for osteomyelitis of the second distal phalanx. No drainable  fluid collection seen on postcontrast images. Chronic appearing fragmentation of the fibular hallux sesamoid, which may represent a bipartite fibular hallux sesamoid or be related to a chronic fracture deformity. Osseous edema within the fibular hallux sesamoid, which may be stress related or be related to degenerative changes.

## 2021-12-14 NOTE — CONSULT NOTE ADULT - SUBJECTIVE AND OBJECTIVE BOX
Podiatry pager #: [phone]/ [phone]    Patient is a 61y old  Male who presents with a chief complaint of LF wound.    HPI:  The patient is a 61y Male here for surgery pre-op.    PAST MEDICAL & SURGICAL HISTORY:  HTN (hypertension)    No significant past surgical history        MEDICATIONS  (STANDING):    MEDICATIONS  (PRN):      Allergies    hydrochlorothiazide (Other)  penicillin (Unknown)    Intolerances        VITALS:    Vital Signs Last 24 Hrs  T(C): 36.7 (12 Dec 2021 13:22), Max: 36.7 (12 Dec 2021 13:22)  T(F): 98 (12 Dec 2021 13:22), Max: 98 (12 Dec 2021 13:22)  HR: 90 (12 Dec 2021 13:22) (88 - 90)  BP: 155/91 (12 Dec 2021 13:22) (138/93 - 155/91)  BP(mean): 112 (12 Dec 2021 13:22) (112 - 112)  RR: 20 (12 Dec 2021 13:22) (18 - 20)  SpO2: 99% (12 Dec 2021 13:22) (99% - 99%)    LABS:                          12.9   9.40  )-----------( 263      ( 12 Dec 2021 11:29 )             36.6       12-12    132<L>  |  97  |  9   ----------------------------<  74  4.6   |  21<L>  |  0.91    Ca    9.3      12 Dec 2021 11:29  Phos  2.6     12-12  Mg     1.8     12-12    TPro  7.8  /  Alb  4.1  /  TBili  0.2  /  DBili  x   /  AST  28  /  ALT  21  /  AlkPhos  78  12-12      CAPILLARY BLOOD GLUCOSE          PT/INR - ( 12 Dec 2021 11:29 )   PT: 11.2 sec;   INR: 0.93 ratio         PTT - ( 12 Dec 2021 11:29 )  PTT:32.6 sec    LOWER EXTREMITY PHYSICAL EXAM:    Vasular: DP/PT 2/4, B/L, CFT <3 seconds B/L, Temperature gradient WNL, B/L.   Neuro: Epicritic sensation diminshed to the level of digits, B/L.  Musculoskeletal/Ortho: LF s/p 3rd toe amputation healed  Skin: LF plantar distal 2nd toe wound to bone, 2nd digit dactylitis, no erythema, no drainage, no malodor          RADIOLOGY & ADDITIONAL STUDIES:    
Smiley Reynolds  [phone]    Patient is a 61y old  Male who presents for toe surgery    HPI:  61M with history of hypertension.  Last year developed L foot 3rd toe OM that was treated with surgical resection for chronic OM.  This time, he was admitted for treatment of non-healing Left foot 2nd toe ulcer since April 2021.  Was treated wiht debridement as outpatient.  He did receive oral bactrim for 2 weeks (last dose was 12/8). Then, MRI obtained as an outpatient and showed OM.  Was referred for admission for surgical management.  Underwent toe resection 12/13.  Podiatry notes suggest low likelihood of residual infection.  He has been on vancomycin x1 and cefepime.  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    ID asked to help management.    prior hospital charts reviewed [x  ]  primary team notes reviewed [ x ]  other consultant notes reviewed [ x ]    PAST MEDICAL & SURGICAL HISTORY:  HTN (hypertension)  Left foot 3rd toe resection  Toe ulcer/OM    Allergies  hydrochlorothiazide (Other)  penicillin (red rash) - has tolerated cefepime in the past    ANTIMICROBIALS:  vancomycin  IVPB (12/12 x1)  cefepime   IVPB 1000 every 8 hours (12/12-)    MEDICATIONS  (STANDING):  amLODIPine   Tablet 5 daily  aspirin enteric coated 81 daily  furosemide    Tablet 20 daily  heparin   Injectable 5000 every 12 hours  losartan 50 daily    SOCIAL HISTORY:  does not smoke; 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    FAMILY HISTORY:  no diabetes, no vascular disease    REVIEW OF SYSTEMS  [  ] ROS unobtainable because:    [ x ] All other systems negative except as noted below:	    Constitutional:  [ ] fever [ ] chills  [ ] weight loss  [ ] weakness  Skin:  [ ] rash [ ] phlebitis	  Eyes: [ ] icterus [ ] pain  [ ] discharge	  ENMT: [ ] sore throat  [ ] thrush [ ] ulcers [ ] exudates  Respiratory: [ ] dyspnea [ ] hemoptysis [ ] cough [ ] sputum	  Cardiovascular:  [ ] chest pain [ ] palpitations [ ] edema	  Gastrointestinal:  [ ] nausea [ ] vomiting [ ] diarrhea [ ] constipation [ ] pain	  Genitourinary:  [ ] dysuria [ ] frequency [ ] hematuria [ ] discharge [ ] flank pain  [ ] incontinence  Musculoskeletal:  [ ] myalgias [ ] arthralgias [ ] arthritis  [ ] back pain  Neurological:  [ ] headache [ ] seizures  [ ] confusion/altered mental status  Psychiatric:  [ ] anxiety [ ] depression	  Hematology/Lymphatics:  [ ] lymphadenopathy  Endocrine:  [ ] adrenal [ ] thyroid  Allergic/Immunologic:	 [ ] transplant [ ] seasonal    Vital Signs Last 24 Hrs  T(F): 98.3 (12-14-21 @ 09:42), Max: 98.5 (12-14-21 @ 06:50)  Vital Signs Last 24 Hrs  HR: 102 (12-14-21 @ 09:54) (71 - 102)  BP: 114/84 (12-14-21 @ 09:54) (114/84 - 136/87)  RR: 18 (12-14-21 @ 09:42)  SpO2: 99% (12-14-21 @ 09:54) (97% - 99%)  Wt(kg): --    PHYSICAL EXAM:  Constitutional: non-toxic, no distress  HEAD/EYES: anicteric  ENT:  supple  Cardiovascular:   normal S1, S2  Respiratory:  clear BS bilaterally  GI:  soft, non-tender, normal bowel sounds  :  no hadley  Musculoskeletal:  Left foot with dressing; he did not let me look today, however, podiatry note appreciated  Neurologic: awake and alert, normal strength, no focal findings  Skin:  no rash  Psychiatric:  awake, alert, appropriate mood                       13.0   7.99  )-----------( 242      ( 14 Dec 2021 07:14 )             37.5     133<L>  |  96  |  15  ----------------------------<  96  4.1   |  24  |  0.90    Ca    9.8      14 Dec 2021 07:14    TPro  7.3  /  Alb  4.1  /  TBili  0.5  /  DBili  x   /  AST  21  /  ALT  19  /  AlkPhos  78  12-13    Sedimentation Rate, Erythrocyte: 42 (12-12 @ 15:31)  C-Reactive Protein, Serum: 8 (12-12 @ 11:29)    MICROBIOLOGY:  Culture - Tissue with Gram Stain (collected 13 Dec 2021 15:05)  Source: Bone LEFT 2ND TOE CLEAN BONE  Gram Stain (13 Dec 2021 22:56):    No polymorphonuclear leukocytes per low power field    No organisms seen per oil power field  Preliminary Report (14 Dec 2021 13:15):    No growth    Culture - Surgical Swab (collected 13 Dec 2021 15:03)  Source: .Surgical Swab left foot deep wound culture  Preliminary Report (14 Dec 2021 13:27):    No growth    RADIOLOGY:  imaging below personally reviewed and agree with findings    Xray Foot AP + Lateral + Oblique, Left (12.13.21 @ 09:38) >  IMPRESSION:  Expected postoperative appearance of second toe partial limitation.    Xray Foot AP + Lateral + Oblique, Left (12.12.21 @ 12:05) >  IMPRESSION: The patient is status post amputation of the middle and distal phalanges of the third digit. There is marked soft tissue swelling of the distal aspect of the second digit with erosive change of the distal aspect of the distal phalanx consistent with osteomyelitis.    MR Foot w/wo IV Cont, Left (12.03.21 @ 15:58) >  Impression: Soft tissue ulcer along the distal aspect of the second toe with findings concerning for osteomyelitis of the second distal phalanx. No drainable fluid collection see non postcontrast images.  Chronic appearing fragmentation of the fibular hallux sesamoid, which may represent a bipartite fibular hallux sesamoid or be related to a chronic fracture deformity. Osseous edema within the fibular hallux sesamoid, which may be stress related or be related to degenerative changes.  
[employer]

## 2021-12-14 NOTE — PHYSICAL THERAPY INITIAL EVALUATION ADULT - PERTINENT HX OF CURRENT PROBLEM, REHAB EVAL
61 yo M with h/o HTN sent to ER for osteomyelitis, of left 3rd  toe/ afberile/normal wbc count, s/p excisional debridement of L foot distal digital wound on 9/23, s/p amputation of 3rd Left toe on 10/12/21. Pt admitted for osteo L foot now presents POD#1 s/p LF partial 2nd ray with Dr. Bermudez

## 2021-12-14 NOTE — DISCHARGE NOTE NURSING/CASE MANAGEMENT/SOCIAL WORK - PATIENT PORTAL LINK FT
You can access the FollowMyHealth Patient Portal offered by [facility] by registering at the following website: [email or web address]. By joining [employer]’s FollowMyHealth portal, you will also be able to view your health information using other applications (apps) compatible with our system.

## 2021-12-14 NOTE — PHYSICAL THERAPY INITIAL EVALUATION ADULT - RISK REDUCTION/PREVENTION, PT EVAL
Assessment  1  Lumbar radiculitis    2  Chronic left-sided low back pain with left-sided sciatica    3  Left leg weakness    4  Bulging lumbar disc        Plan  1  We'll schedule patient for left L5-S1 and left S1 transforaminal epidural steroid injection  This may need to be repeated  Complete risks and benefits including bleeding, infection, tissue reaction, allergic reaction were discussed  Verbal consent obtained  2  Continue current medication regimen and work restrictions from Dr Leonila Jean           My impressions and treatment recommendations were discussed in detail with the patient who verbalized understanding and had no further questions  Discharge instructions were provided  I personally saw and examined the patient and I agree with the above discussed plan of care  Orders Placed This Encounter   Procedures    FL spine and pain procedure     Standing Status:   Future     Standing Expiration Date:   9/4/2021     Order Specific Question:   Reason for Exam:     Answer:   (L) L5-S1 and (L) S1 TFESI     Order Specific Question:   Anticoagulant hold needed? Answer:   no     New Medications Ordered This Visit   Medications    ibuprofen (MOTRIN) 200 mg tablet     Sig: Take 600 mg by mouth every 6 (six) hours as needed       History of Present Illness    Andres Holt is a 34 y o  male seen in consultation at the request of Dr Leonila Jean regarding back and radiating left leg pain  The patient states that he injured his back on June 24th 2020 and has been experiencing significant back and radiating left leg pain  He did participate appropriately in physical therapy and treatments directed from Dr Leonila Jean however has had continued pain  An MRI was obtained demonstrating L4-5 and L5-S1 disc herniations  These are likely contributing to his symptoms at this time      He currently rates his pain as moderate to severe intensity in scores this as a 10/10 at its worst   This is nearly constant throughout the entirety of the day and characterized as sharp, pins and needles, pressure-like  He does describe some subjective lower extremity weakness on the left as result of the pain as well  Aggravating factors include standing, bending, sitting, walking, and exercise  He is unable to determine any significant alleviating factors  Diagnostic studies include MRI of the lumbar spine  I did review the images today  Please see report for full details  I agree with the interpretation from the radiologist demonstrating L4-5 and L5-S1 disc herniations  No relief with physical therapy or exercise  Moderate relief with local heat or ice application  Currently using ibuprofen and he did trial oral steroids as well  I have personally reviewed and/or updated the patient's past medical history, past surgical history, family history, social history, current medications, allergies, and vital signs today  Review of Systems   Constitutional: Positive for unexpected weight change  Negative for fever  HENT: Negative for trouble swallowing  Eyes: Negative for visual disturbance  Respiratory: Negative for shortness of breath and wheezing  Cardiovascular: Negative for chest pain and palpitations  Gastrointestinal: Negative for constipation, diarrhea, nausea and vomiting  Endocrine: Negative for cold intolerance, heat intolerance and polydipsia  Genitourinary: Negative for difficulty urinating and frequency  Musculoskeletal: Positive for back pain (left lumbar and down the leg), gait problem and myalgias  Negative for arthralgias and joint swelling  Skin: Negative for rash  Neurological: Negative for dizziness, seizures, syncope, weakness and headaches  Hematological: Does not bruise/bleed easily  Psychiatric/Behavioral: Negative for dysphoric mood  All other systems reviewed and are negative  There is no problem list on file for this patient        Past Medical History:   Diagnosis Date  Chronic pain disorder     Depression     Low back pain        History reviewed  No pertinent surgical history  Family History   Problem Relation Age of Onset    No Known Problems Mother     No Known Problems Father        Social History     Occupational History    Not on file   Tobacco Use    Smoking status: Never Smoker    Smokeless tobacco: Never Used   Substance and Sexual Activity    Alcohol use: Not Currently    Drug use: Not Currently    Sexual activity: Not on file       Current Outpatient Medications on File Prior to Visit   Medication Sig    ibuprofen (MOTRIN) 200 mg tablet Take 600 mg by mouth every 6 (six) hours as needed    meloxicam (MOBIC) 15 mg tablet Take 1 tablet (15 mg total) by mouth daily (Patient not taking: Reported on 9/3/2020)     No current facility-administered medications on file prior to visit  No Known Allergies    Physical Exam    /76   Pulse 74   Temp 98 °F (36 7 °C)   Resp 14   Ht 6' 4" (1 93 m)   Wt 99 3 kg (219 lb)   BMI 26 66 kg/m²     LUMBAR  General: Well-developed, well-nourished individual in moderate acute distress  Mental: Appropriate mood and affect  Grossly oriented with coherent speech and thought processing   Neuro:  Cranial nerves: Cranial nerve function is grossly intact bilaterally   Strength: Bilateral lower extremity strength is normal and symmetric   No atrophy or tone abnormalities noted   Reflexes: Bilateral lower extremity muscle stretch reflexes are physiologic and symmetric   No ankle clonus is noted   Sensation: No loss of sensation is noted   SLR/Foraminal Compression Maneuvers: Straight leg raising in the sitting position is positive for radicular pain on the left  Gait:  Gait/gross motor: Gait is antalgic on the left   Station is normal  Toe walking, heel walking  are normal     Musculoskeletal:  Palpation: Inspection and palpation of the spine and extremities are unremarkable except for tenderness to palpation along the left lumbosacral region  Spine:  Lumbar spine range of motion significantly limited especially with forward flexion, this significantly reproduces pain complaint  He does have adequate lumbar extension with no reproduction of pain    No gross axial skeletal deformities   Skin: Skin inspection grossly negative for erythema, breakdown, or concerning lesions in affected area   Lymph: No lymphadenopathy is appreciated in the involved extremity   Vessels: No lower extremity edema   Lungs: Breathing is comfortable and regular  No dyspnea noted during examination   Eyes: Visual field grossly intact to confrontation  No redness appreciated  ENT: No craniofacial deformities or asymmetry  No neck masses appreciated           Imaging  MRI Lumbar Spine 7/15/20  L4-5 Left lateral disc protrusion with mild to modrate compromise of left foramen  L5S1 broad based central disc protrustion        6/2020  LUMBAR SPINE     INDICATION:   B72 96: Radiculopathy, lumbar region      COMPARISON:  None     VIEWS:  XR SPINE LUMBAR MINIMUM 4 VIEWS NON INJURY     FINDINGS:     There are 5 non rib bearing lumbar vertebral bodies       There is no evidence of acute fracture or destructive osseous lesion      Alignment is unremarkable       No significant lumbar degenerative change noted      The pedicles appear intact      Soft tissues are unremarkable      IMPRESSION:     Normal examination          MRI Lumbar Spine 7/15/20  L4-5 Left lateral disc protrusion with mild to modrate compromise of left foramen  L5S1 broad based central disc protrustion     I risk factors

## 2021-12-14 NOTE — PHYSICAL THERAPY INITIAL EVALUATION ADULT - DIAGNOSIS, PT EVAL
Pt presents with impairments in balance and endurance at times impacting ability to perform ADLs and functional mobility

## 2021-12-14 NOTE — PROGRESS NOTE ADULT - ASSESSMENT
Pt is 60yo w/ s/p LF partial 2nd toe amp DOS 12/13  -pt seen and examined  -Afebrile, no leukocytosis  -POD 1: s/p LF partial 2nd toe amputation, sutures well coapted, no drainage, no erythema, no local signs of infection   -LFXR demonstrating OM of LF 2nd toe  -Pod stable for discharge on 1 week PO Augmentin  -Pt to keep dressing clean, dry and intact until follow up  -Seen w/ attending

## 2021-12-14 NOTE — PHYSICAL THERAPY INITIAL EVALUATION ADULT - BALANCE TRAINING, PT EVAL
GOAL: Pt will improve standing balance by at least 1/2 grade to decrease fall risk during functional mobility within 2 weeks.

## 2021-12-16 LAB
-  AMPICILLIN/SULBACTAM: SIGNIFICANT CHANGE UP
-  CEFAZOLIN: SIGNIFICANT CHANGE UP
-  CLINDAMYCIN: SIGNIFICANT CHANGE UP
-  ERYTHROMYCIN: SIGNIFICANT CHANGE UP
-  GENTAMICIN: SIGNIFICANT CHANGE UP
-  OXACILLIN: SIGNIFICANT CHANGE UP
-  PENICILLIN: SIGNIFICANT CHANGE UP
-  RIFAMPIN: SIGNIFICANT CHANGE UP
-  TETRACYCLINE: SIGNIFICANT CHANGE UP
-  TRIMETHOPRIM/SULFAMETHOXAZOLE: SIGNIFICANT CHANGE UP
-  VANCOMYCIN: SIGNIFICANT CHANGE UP
METHOD TYPE: SIGNIFICANT CHANGE UP

## 2021-12-18 LAB
CULTURE RESULTS: SIGNIFICANT CHANGE UP
ORGANISM # SPEC MICROSCOPIC CNT: SIGNIFICANT CHANGE UP
ORGANISM # SPEC MICROSCOPIC CNT: SIGNIFICANT CHANGE UP
SPECIMEN SOURCE: SIGNIFICANT CHANGE UP

## 2021-12-19 LAB
-  AMPICILLIN/SULBACTAM: SIGNIFICANT CHANGE UP
-  CEFAZOLIN: SIGNIFICANT CHANGE UP
-  CLINDAMYCIN: SIGNIFICANT CHANGE UP
-  ERYTHROMYCIN: SIGNIFICANT CHANGE UP
-  GENTAMICIN: SIGNIFICANT CHANGE UP
-  OXACILLIN: SIGNIFICANT CHANGE UP
-  PENICILLIN: SIGNIFICANT CHANGE UP
-  RIFAMPIN: SIGNIFICANT CHANGE UP
-  TETRACYCLINE: SIGNIFICANT CHANGE UP
-  TRIMETHOPRIM/SULFAMETHOXAZOLE: SIGNIFICANT CHANGE UP
-  VANCOMYCIN: SIGNIFICANT CHANGE UP
CULTURE RESULTS: SIGNIFICANT CHANGE UP
METHOD TYPE: SIGNIFICANT CHANGE UP
ORGANISM # SPEC MICROSCOPIC CNT: SIGNIFICANT CHANGE UP
ORGANISM # SPEC MICROSCOPIC CNT: SIGNIFICANT CHANGE UP
SPECIMEN SOURCE: SIGNIFICANT CHANGE UP

## 2022-03-25 NOTE — ED PROVIDER NOTE - WR ORDER DATE AND TIME 2
12-Dec-2021 13:33 Cimzia Pregnancy And Lactation Text: This medication crosses the placenta but can be considered safe in certain situations. Cimzia may be excreted in breast milk.

## 2023-04-11 NOTE — PATIENT PROFILE ADULT - FOOD INSECURITY
What Type Of Note Output Would You Prefer (Optional)?: Bullet Format
What Is The Reason For Today's Visit?: Full Body Skin Examination
no

## 2024-03-14 NOTE — DISCHARGE NOTE PROVIDER - HOSPITAL COURSE
60y Male complaining of foot pain/injury Pmhx HTN presenting with left third toe infection diagnosed by MRI to have osteomyelitis a couple days ago, now presenting for surgery with podiatry Dr. Smith. admited with Dx: OM of Left  foot - 3rd toe - xray foot conformed COVID 19 PCR + ve. per pod left foot surgery cancelled  9/25 due to COVID positive status. return to hospital once COVID negative for left foot partial third ray resection.c/w po abx per ID, pt n/w hyponatremia   60y Male complaining of foot pain/injury Pmhx HTN presenting with left third toe infection diagnosed by MRI to have osteomyelitis a couple days ago, now presenting for surgery with podiatry Dr. Smith. admited with Dx: OM of Left  foot - 3rd toe - xray foot conformed COVID 19 PCR + ve. per pod left foot surgery cancelled  9/25 due to COVID positive status. return to hospital once COVID negative for left foot partial third ray resection.c/w po abx per ID, pt n/w hyponatremia with Na+ as low as 123. Renal followed and thinks hyponatremia is attributed to HCTZ & D5W IVF. Sodium corrected to 130 today. Medically cleared for discharge by Ashwini Chery & Chani. Yes

## 2024-05-07 ENCOUNTER — EMERGENCY (EMERGENCY)
Facility: HOSPITAL | Age: 64
LOS: 1 days | Discharge: HOME | End: 2024-05-07
Attending: STUDENT IN AN ORGANIZED HEALTH CARE EDUCATION/TRAINING PROGRAM | Admitting: STUDENT IN AN ORGANIZED HEALTH CARE EDUCATION/TRAINING PROGRAM
Payer: MEDICARE

## 2024-05-07 VITALS
RESPIRATION RATE: 18 BRPM | TEMPERATURE: 97 F | SYSTOLIC BLOOD PRESSURE: 172 MMHG | HEART RATE: 92 BPM | OXYGEN SATURATION: 93 % | DIASTOLIC BLOOD PRESSURE: 108 MMHG

## 2024-05-07 LAB
ALBUMIN SERPL ELPH-MCNC: 3.7 G/DL — SIGNIFICANT CHANGE UP (ref 3.3–5)
ALP SERPL-CCNC: 63 U/L — SIGNIFICANT CHANGE UP (ref 40–120)
ALT FLD-CCNC: 20 U/L — SIGNIFICANT CHANGE UP (ref 12–78)
ANION GAP SERPL CALC-SCNC: 8 MMOL/L — SIGNIFICANT CHANGE UP (ref 5–17)
APTT BLD: 26.7 SEC — SIGNIFICANT CHANGE UP (ref 24.5–35.6)
AST SERPL-CCNC: 24 U/L — SIGNIFICANT CHANGE UP (ref 15–37)
BASOPHILS # BLD AUTO: 0.05 K/UL — SIGNIFICANT CHANGE UP (ref 0–0.2)
BASOPHILS NFR BLD AUTO: 0.6 % — SIGNIFICANT CHANGE UP (ref 0–2)
BILIRUB SERPL-MCNC: 0.5 MG/DL — SIGNIFICANT CHANGE UP (ref 0.2–1.2)
BUN SERPL-MCNC: 14 MG/DL — SIGNIFICANT CHANGE UP (ref 7–23)
CALCIUM SERPL-MCNC: 9.5 MG/DL — SIGNIFICANT CHANGE UP (ref 8.5–10.1)
CHLORIDE SERPL-SCNC: 105 MMOL/L — SIGNIFICANT CHANGE UP (ref 96–108)
CO2 SERPL-SCNC: 23 MMOL/L — SIGNIFICANT CHANGE UP (ref 22–31)
CREAT SERPL-MCNC: 1.3 MG/DL — SIGNIFICANT CHANGE UP (ref 0.5–1.3)
EGFR: 61 ML/MIN/1.73M2 — SIGNIFICANT CHANGE UP
EOSINOPHIL # BLD AUTO: 0.17 K/UL — SIGNIFICANT CHANGE UP (ref 0–0.5)
EOSINOPHIL NFR BLD AUTO: 2.2 % — SIGNIFICANT CHANGE UP (ref 0–6)
ETHANOL SERPL-MCNC: 172 MG/DL — HIGH (ref 0–10)
GLUCOSE SERPL-MCNC: 118 MG/DL — HIGH (ref 70–99)
HCT VFR BLD CALC: 34.8 % — LOW (ref 39–50)
HGB BLD-MCNC: 12.4 G/DL — LOW (ref 13–17)
IMM GRANULOCYTES NFR BLD AUTO: 0.3 % — SIGNIFICANT CHANGE UP (ref 0–0.9)
INR BLD: 0.9 RATIO — SIGNIFICANT CHANGE UP (ref 0.85–1.18)
LYMPHOCYTES # BLD AUTO: 2 K/UL — SIGNIFICANT CHANGE UP (ref 1–3.3)
LYMPHOCYTES # BLD AUTO: 25.4 % — SIGNIFICANT CHANGE UP (ref 13–44)
MAGNESIUM SERPL-MCNC: 1.7 MG/DL — SIGNIFICANT CHANGE UP (ref 1.6–2.6)
MCHC RBC-ENTMCNC: 33.4 PG — SIGNIFICANT CHANGE UP (ref 27–34)
MCHC RBC-ENTMCNC: 35.6 GM/DL — SIGNIFICANT CHANGE UP (ref 32–36)
MCV RBC AUTO: 93.8 FL — SIGNIFICANT CHANGE UP (ref 80–100)
MONOCYTES # BLD AUTO: 1.06 K/UL — HIGH (ref 0–0.9)
MONOCYTES NFR BLD AUTO: 13.5 % — SIGNIFICANT CHANGE UP (ref 2–14)
NEUTROPHILS # BLD AUTO: 4.57 K/UL — SIGNIFICANT CHANGE UP (ref 1.8–7.4)
NEUTROPHILS NFR BLD AUTO: 58 % — SIGNIFICANT CHANGE UP (ref 43–77)
NRBC # BLD: 0 /100 WBCS — SIGNIFICANT CHANGE UP (ref 0–0)
PLATELET # BLD AUTO: 199 K/UL — SIGNIFICANT CHANGE UP (ref 150–400)
POTASSIUM SERPL-MCNC: 4.2 MMOL/L — SIGNIFICANT CHANGE UP (ref 3.5–5.3)
POTASSIUM SERPL-SCNC: 4.2 MMOL/L — SIGNIFICANT CHANGE UP (ref 3.5–5.3)
PROT SERPL-MCNC: 7.1 G/DL — SIGNIFICANT CHANGE UP (ref 6–8.3)
PROTHROM AB SERPL-ACNC: 10.6 SEC — SIGNIFICANT CHANGE UP (ref 9.5–13)
RBC # BLD: 3.71 M/UL — LOW (ref 4.2–5.8)
RBC # FLD: 11.9 % — SIGNIFICANT CHANGE UP (ref 10.3–14.5)
SODIUM SERPL-SCNC: 136 MMOL/L — SIGNIFICANT CHANGE UP (ref 135–145)
WBC # BLD: 7.87 K/UL — SIGNIFICANT CHANGE UP (ref 3.8–10.5)
WBC # FLD AUTO: 7.87 K/UL — SIGNIFICANT CHANGE UP (ref 3.8–10.5)

## 2024-05-07 PROCEDURE — 72125 CT NECK SPINE W/O DYE: CPT | Mod: 26,MC

## 2024-05-07 PROCEDURE — 99285 EMERGENCY DEPT VISIT HI MDM: CPT | Mod: FS

## 2024-05-07 PROCEDURE — 70450 CT HEAD/BRAIN W/O DYE: CPT | Mod: 26,MC

## 2024-05-07 RX ORDER — SODIUM CHLORIDE 9 MG/ML
1000 INJECTION INTRAMUSCULAR; INTRAVENOUS; SUBCUTANEOUS ONCE
Refills: 0 | Status: COMPLETED | OUTPATIENT
Start: 2024-05-07 | End: 2024-05-07

## 2024-05-07 RX ADMIN — SODIUM CHLORIDE 1000 MILLILITER(S): 9 INJECTION INTRAMUSCULAR; INTRAVENOUS; SUBCUTANEOUS at 21:49

## 2024-05-07 NOTE — ED PROVIDER NOTE - PROGRESS NOTE DETAILS
Patient ambulated in ED using a walker.  States he feels well.  Will call wife to take patient home.  He has follow up with neurology later today. Patient ambulated in ED using a walker.  States he feels well.  Will call wife to take patient home.  He has follow up with neurology later today.  Counseled on alcohol cessation.  Patient expressed understanding, return precautions discussed.

## 2024-05-07 NOTE — ED ADULT NURSE REASSESSMENT NOTE - NS ED NURSE REASSESS COMMENT FT1
iv lock 20 g placed to R forearm, patent and flushing. fluids running. NAD. pending dispo upon ambulation.

## 2024-05-07 NOTE — ED PROVIDER NOTE - CLINICAL SUMMARY MEDICAL DECISION MAKING FREE TEXT BOX
63 year old male p/w fall at home that occurred while intoxicated.  Takes [id number] daily.  Unwitnessed fall, unclear if there was head trauma.  Patient intoxicated but calm and cooperative with exam, answers questions appropriately.  Undergoing neuro evaluation for gait disturbance.  Check labs, BAL, EKG, CT head and c-spine, hydrate, ambulate.  Admit if patient unable to ambulate using walker

## 2024-05-07 NOTE — ED ADULT NURSE NOTE - NSFALLRISKINTERV_ED_ALL_ED

## 2024-05-07 NOTE — ED PROVIDER NOTE - NSFOLLOWUPINSTRUCTIONS_ED_ALL_ED_FT
Please refrain from drinking alcohol.  Follow up with your neurologist.  Return to the ER for persistent fall, weakness, gait disturbance, numbness, headache, head injury, or any other concerns.     Alcohol Intoxication    Alcohol intoxication occurs when a person no longer thinks clearly or functions well (becomes impaired) after drinking alcohol. Intoxication can occur with just one drink. The legal definition of alcohol intoxication depends on the amount of alcohol in the blood (blood alcohol concentration, PADDY). PADDY of 80–100 mg/dL or higher is commonly considered legally intoxicated. The level of impairment depends on:    The amount of alcohol the person had.  The person's age, gender, and weight.  How often the person drinks.  Whether the person has other medical conditions, such as diabetes, seizures, or a heart condition.    Alcohol intoxication can range from mild to severe. The condition can be dangerous, especially if the person:    Also took certain drugs or prescription medicines.  Drinks a large amount of alcohol in a short period of time (binge drinks).    For women, binge drinking is having four or more drinks at one time.  For men, binge drinking is having five or more drinks at one time.    If you or anyone around you appears intoxicated, speak up and act.    What are the causes?  This condition is caused by drinking alcohol.    What increases the risk?  The following factors may make you more likely to develop this condition:    Peer pressure in young adults.  Difficulty managing stress.  History of drug or alcohol abuse.  Combining alcohol with drugs.  Family history of drug or alcohol abuse.  Low body weight.  Binge drinking.    What are the signs or symptoms?  Symptoms of alcohol intoxication can vary from person to person. Symptoms can be mild, moderate, or severe.    Symptoms of mild alcohol intoxication may include:    Feeling relaxed or sleepy.  Having mild difficulty with coordination, speech, memory, or attention.    Symptoms of moderate alcohol intoxication may include:    Extreme emotions, like anger or sadness.  Moderate difficulty with coordination, speech, memory, or attention.    Symptoms of severe alcohol intoxication may include:    Severe difficulty with coordination, speech, memory, or attention.  Passing out.  Vomiting.  Confusion.  Slow breathing.  Coma.    Intoxication can change quickly from mild to severe. It can cause coma or death, especially in people who are not exposed to alcohol often.    How is this diagnosed?  Your health care provider will ask you how much alcohol you drank and what kind you had. Intoxication may also be diagnosed based on:    Your symptoms and medical history.  A physical exam.  A blood test that measures PADDY.  A smell of alcohol on your breath.    How is this treated?  Treatment for alcohol intoxication may include:    Being monitored in an emergency department, hospital, or treatment center until your PADDY comes down and it is safe for you to go home.  IV fluids to prevent or treat loss of fluid in the body (dehydration).  Medicine to treat nausea or vomiting or to get rid of alcohol in the body.  Counseling (brief intervention) about the dangers of using alcohol.  Treatment for substance use disorder.  Oxygen therapy or a breathing machine (ventilator).    Long-term (chronic) exposure to alcohol can have long-term effects on your brain, heart, and gastrointestinal system. These effects can be serious and may also require treatment.    Follow these instructions at home:         Eating and drinking     Do not drink alcohol if:    Your health care provider tells you not to drink.  You are pregnant, may be pregnant, or are planning to become pregnant.  You are under the legal drinking age (21 years old in the [place]  You are taking medicines that should not be taken with alcohol.  You have a medical condition, and alcohol makes it worse.  You need to drive or perform activities that require you to be alert.  You have substance use disorder.  Ask your health care provider if alcohol is safe for you. If your health care provider allows you to drink alcohol, limit how much you have. You may drink:    0–1 drink a day for women.   0–2 drinks a day for men.     Be aware of how much alcohol is in your drink. In the [place] one drink equals one 12 oz bottle of beer (355 mL), one 5 oz glass of wine (148 mL), or one 1½ oz shot of hard liquor (44 mL).  Avoid drinking alcohol on an empty stomach.  Stay hydrated. Drink enough fluid to keep your urine pale yellow. Avoid caffeine because it can dehydrate you.  Avoid drinking more than one drink per hour.  When having multiple drinks, drink water or a non-alcoholic beverage between alcoholic drinks.        General instructions    Take over-the-counter and prescription medicines only as told by your health care provider.  Do not drive after drinking any amount of alcohol. Plan for a designated  or another way to go home.  Have someone responsible stay with you while you are intoxicated. You should not be left alone.  Keep all follow-up visits as told by your health care provider. This is important.    Contact a health care provider if:  You do not feel better after a few days.  You have problems at work, at school, or at home due to drinking.    Get help right away if:  You have any of the following:    Moderate to severe trouble with coordination, speech, memory, or attention.  Trouble staying awake.  Severe confusion.  A seizure.  Light-headedness.  Fainting.  Vomiting bright red blood or material that looks like coffee grounds.  Bloody stool (feces). The blood may make your stool bright red, black, or tarry. It may also smell bad.  Shakiness when trying to stop drinking.  Thoughts about hurting yourself or others.    If you ever feel like you may hurt yourself or others, or have thoughts about taking your own life, get help right away. You can go to your nearest emergency department or call:    Your local emergency services (911 in the [place]  A suicide crisis helpline, such as the National Suicide Prevention Lifeline at 1-265.820.6367. This is open 24 hours a day.    Summary  Alcohol intoxication occurs when a person no longer thinks clearly or functions well after drinking alcohol.  If your health care provider says that alcohol is safe for you, limit alcohol intake to no more than 1 drink a day for women (no drinks if you are pregnant) and 2 drinks a day for men. One drink equals 12 oz of beer, 5 oz of wine, or 1½ oz of hard liquor.  Contact your health care provider if drinking has caused you problems at work, school, or home.  Get help right away if you have thoughts about hurting yourself or others.    ADDITIONAL NOTES AND INSTRUCTIONS    Please follow up with your Primary MD in 24-48 hr.  Seek immediate medical care for any new/worsening signs or symptoms.

## 2024-05-07 NOTE — ED PROVIDER NOTE - CARE PROVIDER_API CALL
Katherine Arboleda  Neurology  [address]  [place] [address]  Phone: [phone]  Fax: [phone]  Follow Up Time:

## 2024-05-07 NOTE — ED PROVIDER NOTE - DIFFERENTIAL DIAGNOSIS
Ddx includes but not limited to alcohol intoxication, ICH, c-spine fracture, herniated disc, electrolyte imbalance, hypotension, cardiac arrythmia, syncope Differential Diagnosis

## 2024-05-07 NOTE — ED ADULT NURSE NOTE - OBJECTIVE STATEMENT
pt BIBEMS c/o fall. pt admits to drinking alcohol. wife @ bedside states she was not w/ him @ the time. pt generally has an unsteady gait. as per pt, he fell on his back. as per wife, pt is undergoing neurological changes in the last several months. pt is scheduled for neuro appt in the am. no complaints of pain or discomfort. pt takes baby aspirin daily. pending ct scan.

## 2024-05-07 NOTE — ED PROVIDER NOTE - ATTENDING APP SHARED VISIT CONTRIBUTION OF CARE
63 year old male with a history of HTN p/w fall.   Patient ALONSO, resides at home.  States he was walking in his kitchen, turned, lost his balance and fell.  Denies LOC, states his legs felt weak and gave out.  He was unable to get up on his own at which point EMS was called to the scene. Patient ordinarily ambulates with a walker and takes ASA 81mg daily.  He lives with his wife but she did not witness the fall and it is unclear if patient hit his head.  He admits to drinking at least 3 glasses of wine today.  In addition, patient has been undergoing an evaluation for gait disturbance/unsteadiness for several months.  ?"Fluid in the brain" on outpatient CT head.  He is following with neurology Dr. Arboleda and Dr. Aguilar.

## 2024-05-07 NOTE — ED PROVIDER NOTE - PATIENT PORTAL LINK FT
You can access the FollowMyHealth Patient Portal offered by [facility] by registering at the following website: [email or web address]. By joining [facility] [facility]’s FollowMyHealth portal, you will also be able to view your health information using other applications (apps) compatible with our system.

## 2024-05-07 NOTE — ED PROVIDER NOTE - PRO INTERPRETER NEED 2
English

## 2024-05-07 NOTE — ED PROVIDER NOTE - OBJECTIVE STATEMENT
63 M hx htn, on ASA BIBEMS from home after a fall. Pt states he was walking with his walker and lost his balance and fell. Admits to drinking alcohol. Wife at bedside states that she was not with pt at the time, he was with another family member. She states for the past few months pt has been undergoing evaluation for gait issues, neurological issues. Had a fall April 15, sustained head injury, symptoms worsened. Was seen at his PMD today for follow up, had labs done. Has appt with his neurologist Dr. Arboleda tomorrow to discuss results of tests that were done. Wife states pt did not have brain bleed but had imaging showing "fluid in his brain". Today ambulance was called when pt fell bc family members were unable to get up up from standing, "dead weight", could not walk. Pt offers no acute complaints at this time.

## 2024-05-08 VITALS
HEART RATE: 75 BPM | DIASTOLIC BLOOD PRESSURE: 93 MMHG | OXYGEN SATURATION: 97 % | SYSTOLIC BLOOD PRESSURE: 160 MMHG | RESPIRATION RATE: 18 BRPM | TEMPERATURE: 98 F

## 2024-05-08 PROCEDURE — 85610 PROTHROMBIN TIME: CPT

## 2024-05-08 PROCEDURE — 83735 ASSAY OF MAGNESIUM: CPT

## 2024-05-08 PROCEDURE — 93005 ELECTROCARDIOGRAM TRACING: CPT

## 2024-05-08 PROCEDURE — 36415 COLL VENOUS BLD VENIPUNCTURE: CPT

## 2024-05-08 PROCEDURE — 93010 ELECTROCARDIOGRAM REPORT: CPT

## 2024-05-08 PROCEDURE — 80307 DRUG TEST PRSMV CHEM ANLYZR: CPT

## 2024-05-08 PROCEDURE — 85730 THROMBOPLASTIN TIME PARTIAL: CPT

## 2024-05-08 PROCEDURE — 99285 EMERGENCY DEPT VISIT HI MDM: CPT | Mod: 25

## 2024-05-08 PROCEDURE — 72125 CT NECK SPINE W/O DYE: CPT | Mod: MC

## 2024-05-08 PROCEDURE — 70450 CT HEAD/BRAIN W/O DYE: CPT | Mod: MC

## 2024-05-08 PROCEDURE — 85025 COMPLETE CBC W/AUTO DIFF WBC: CPT

## 2024-05-08 PROCEDURE — 80053 COMPREHEN METABOLIC PANEL: CPT

## 2024-06-05 NOTE — ED ADULT NURSE NOTE - NS ED NURSE LEVEL OF CONSCIOUSNESS MENTAL STATUS
Alert/Awake My signature below certifies that the above stated patient is homebound and upon completion of the Face-To-Face encounter, has the need for intermittent skilled nursing, physical therapy and/or speech or occupational therapy services in their home for their current diagnosis as outlined in their initial plan of care. These services will continue to be monitored by myself or another physician.

## 2024-06-07 ENCOUNTER — APPOINTMENT (OUTPATIENT)
Dept: SPINE | Facility: CLINIC | Age: 64
End: 2024-06-07

## 2024-06-27 NOTE — H&P ADULT - NSHPREVIEWOFSYSTEMS_GEN_ALL_CORE
Subjective   Patient ID: Markus Perez is a 36 y.o. male who presents for 6 month check up (And review labs ).    Subjective  Markus Perez is a 36 y.o. male and is here for a comprehensive physical exam. The patient reports diabetes, hypertension, hyperlipidemia.    Do you take any herbs or supplements that were not prescribed by a doctor? no  Are you taking calcium supplements? no  Are you taking aspirin daily? no      History:  Any STD's in the past? none           Diabetes  He presents for his follow-up diabetic visit. He has type 2 diabetes mellitus. His disease course has been improving. There are no hypoglycemic associated symptoms. Pertinent negatives for diabetes include no blurred vision, no fatigue, no foot paresthesias, no foot ulcerations, no polydipsia, no polyphagia, no polyuria, no visual change, no weakness and no weight loss. There are no hypoglycemic complications. Diabetic complications include impotence. Risk factors for coronary artery disease include diabetes mellitus, dyslipidemia, family history, obesity, male sex and hypertension. Current diabetic treatment includes diet and oral agent (dual therapy). He is compliant with treatment all of the time. His weight is decreasing steadily. He is following a generally healthy diet. When asked about meal planning, he reported none. He has not had a previous visit with a dietitian. He participates in exercise intermittently. An ACE inhibitor/angiotensin II receptor blocker is being taken. He does not see a podiatrist.Eye exam is current.   Hyperlipidemia  This is a chronic problem. The current episode started more than 1 year ago. The problem is controlled. Recent lipid tests were reviewed and are low. Exacerbating diseases include diabetes and obesity. There are no known factors aggravating his hyperlipidemia. Current antihyperlipidemic treatment includes statins. The current treatment provides significant improvement of lipids. There are no  compliance problems.    Hypertension  This is a chronic problem. The problem is unchanged. The problem is controlled. Pertinent negatives include no blurred vision, neck pain or palpitations. There are no associated agents to hypertension. Past treatments include ACE inhibitors. The current treatment provides significant improvement. Compliance problems include medication side effects.         Review of Systems   Constitutional:  Negative for activity change, appetite change, chills, diaphoresis, fatigue, fever, unexpected weight change and weight loss.   HENT:  Negative for hearing loss, nosebleeds, postnasal drip, sore throat, trouble swallowing and voice change.    Eyes:  Negative for blurred vision, photophobia, pain, discharge, redness, itching and visual disturbance.   Respiratory:  Negative for cough, choking, chest tightness and wheezing.    Cardiovascular:  Negative for palpitations and leg swelling.   Gastrointestinal:  Negative for abdominal distention, abdominal pain, blood in stool, constipation, diarrhea, nausea and vomiting.   Endocrine: Negative for cold intolerance, heat intolerance, polydipsia, polyphagia and polyuria.   Genitourinary:  Positive for impotence. Negative for dysuria, flank pain, frequency, hematuria and urgency.   Musculoskeletal:  Negative for arthralgias, back pain, joint swelling, neck pain and neck stiffness.   Skin:  Negative for rash and wound.   Allergic/Immunologic: Negative for immunocompromised state.   Neurological:  Negative for syncope, facial asymmetry, weakness, light-headedness and numbness.   Hematological:  Negative for adenopathy. Does not bruise/bleed easily.   Psychiatric/Behavioral:  Negative for agitation, behavioral problems, dysphoric mood, hallucinations, self-injury, sleep disturbance and suicidal ideas.        Objective   /81 (BP Location: Right arm, Patient Position: Sitting, BP Cuff Size: Large adult)   Pulse 87   Temp 36.2 °C (97.2 °F)  "(Temporal)   Resp 18   Ht 1.854 m (6' 1\")   Wt 115 kg (254 lb)   SpO2 95%   BMI 33.51 kg/m²     Physical Exam  Constitutional:       General: He is not in acute distress.     Appearance: He is not ill-appearing or diaphoretic.   HENT:      Head: Normocephalic and atraumatic.      Right Ear: External ear normal.      Left Ear: External ear normal.      Nose: Nose normal. No rhinorrhea.   Eyes:      General: Lids are normal. No scleral icterus.        Right eye: No discharge.         Left eye: No discharge.      Conjunctiva/sclera: Conjunctivae normal.   Cardiovascular:      Rate and Rhythm: Normal rate and regular rhythm.      Pulses: Normal pulses.      Heart sounds: No murmur heard.  Pulmonary:      Effort: Pulmonary effort is normal. No respiratory distress.      Breath sounds: No decreased breath sounds, wheezing, rhonchi or rales.   Abdominal:      General: Bowel sounds are normal. There is no distension.      Palpations: Abdomen is soft. There is no mass.      Tenderness: There is no abdominal tenderness. There is no guarding or rebound.   Musculoskeletal:         General: No swelling, tenderness or deformity.      Cervical back: No rigidity or tenderness.      Right lower leg: No edema.      Left lower leg: No edema.   Lymphadenopathy:      Cervical: No cervical adenopathy.      Upper Body:      Right upper body: No supraclavicular adenopathy.      Left upper body: No supraclavicular adenopathy.   Skin:     General: Skin is warm and dry.      Coloration: Skin is not jaundiced or pale.      Findings: No erythema, lesion or rash.   Neurological:      General: No focal deficit present.      Mental Status: He is alert and oriented to person, place, and time.      Sensory: No sensory deficit.      Motor: No weakness or tremor.      Coordination: Coordination normal.      Gait: Gait normal.   Psychiatric:         Mood and Affect: Mood normal. Affect is not inappropriate.         Behavior: Behavior normal. "         Assessment/Plan   Diagnoses and all orders for this visit:  Routine general medical examination at a health care facility  Screening for HIV (human immunodeficiency virus)  -     HIV 1/2 Antigen/Antibody Screen with Reflex to Confirmation; Future  Encounter for hepatitis C screening test for low risk patient  -     Hepatitis C antibody; Future  Hypertriglyceridemia  -     Follow Up In [facility] - PCP - Established  -     Comprehensive Metabolic Panel; Future  -     Lipid Panel; Future  -     TSH with reflex to Free T4 if abnormal; Future  -     Follow Up In [facility] - PCP - Established; Future  -     rosuvastatin (Crestor) 10 mg tablet; Take 1 tablet (10 mg) by mouth once daily.  Primary hypertension  -     Follow Up In [facility] - PCP - Established  -     Albumin-Creatinine Ratio, Urine Random; Future  -     CBC and Auto Differential; Future  -     Comprehensive Metabolic Panel; Future  -     Lipid Panel; Future  -     Magnesium; Future  -     TSH with reflex to Free T4 if abnormal; Future  -     Follow Up In [facility] - PCP - Established; Future  -     losartan (Cozaar) 50 mg tablet; Take 1 tablet (50 mg) by mouth once daily.  Type 2 diabetes mellitus with microalbuminuria, without long-term current use of insulin (Multi)  -     Follow Up In [facility] - PCP - Established  -     empagliflozin (Jardiance) 10 mg; Take 1 tablet (10 mg) by mouth once daily.  -     Albumin-Creatinine Ratio, Urine Random; Future  -     CBC and Auto Differential; Future  -     Comprehensive Metabolic Panel; Future  -     Hemoglobin A1C; Future  -     Lipid Panel; Future  -     Magnesium; Future  -     TSH with reflex to Free T4 if abnormal; Future  -     Follow Up In [facility] - PCP - Established; Future  Class 1 obesity due to excess calories with serious comorbidity and body mass index (BMI) of 33.0 to 33.9 in adult  -     Albumin-Creatinine Ratio, Urine  Random; Future  -     CBC and Auto Differential; Future  -     Comprehensive Metabolic Panel; Future  -     Hemoglobin A1C; Future  -     Lipid Panel; Future  -     Magnesium; Future  -     TSH with reflex to Free T4 if abnormal; Future  -     Follow Up In [facility] - PCP - Established; Future  Infertility male  -     Referral to Urology; Future  Type 2 diabetes mellitus without complication, without long-term current use of insulin (Multi)  -     tirzepatide (Mounjaro) 7.5 mg/0.5 mL pen injector; Inject 7.5 mg under the skin 1 (one) time per week.  -     metFORMIN  mg 24 hr tablet; TAKE 2 TABLETS BY MOUTH IN THE MORNING AFTER MEALS AND 2 TABLETS EVERY EVENING AFTER MEALS  Combined arterial insufficiency and corporo-venous occlusive erectile dysfunction  -     tadalafil (Cialis) 5 mg tablet; Take 1 tablet (5 mg) by mouth once daily.  2. Patient Counseling:  --Nutrition: Stressed importance of moderation in sodium/caffeine intake, saturated fat and cholesterol, caloric balance, sufficient intake of fresh fruits, vegetables, fiber, calcium, iron.  --Discussed  the daily use of baby aspirin.  --Exercise: Stressed the importance of regular exercise.   --Substance Abuse: Discussed cessation/primary prevention of tobacco, alcohol, or other drug use; driving or other dangerous activities under the influence; availability of treatment for abuse.   --Injury prevention: Discussed safety belts, safety helmets, smoke detector, smoking near bedding or upholstery.   --Dental health: Discussed importance of regular tooth brushing, flossing, and dental visits.  --Immunizations reviewed.  3. Discussed the patient's BMI with him.  The BMI is above average. The patient received Current weight: 115 kg (254 lb)  Weight change since last visit (-) denotes wt loss -8 lbs   Weight loss needed to achieve BMI 25: 64.9 Lbs  Weight loss needed to achieve BMI 30: 27.1 Lbs    Provided instructions on dietary changes  Provided  instructions on exercise  Advised to Increase physical activity because they have an above normal BMI.  4. Follow up 3 mos wlabs     REVIEW OF SYSTEMS:  GEN: no fever,    no chills  RESP: no SOB,   no cough  CVS: no chest pain,   no palpitations  GI: no abdominal pain,   no nausea,   no vomiting,   no constipation,   no diarrhea  : no dysuria,   no frequency  NEURO: no headache,   no dizziness  PSYCH: no depression,   not anxious  Derm : no rash

## 2024-09-09 NOTE — DISCHARGE NOTE PROVIDER - NSDCCAREPROVSEEN_GEN_ALL_CORE_FT
[FreeTextEntry1] : PE: Constitutional: In NAD, calm and cooperative MSK (Back/buttocks)  Inspection: no gross swelling identified  Palpation: No tenderness of the bilateral lower lumbar paraspinals, mild TTP over origin of hamstrings  ROM: No pain at end lumbar extension/flexion, AROM intact  Strength: 5/5 strength in bilateral lower extremities  Reflexes: 2+ Patella reflex bilaterally, 2+ Achilles reflex bilaterally, negative clonus bilaterally  Sensation: Intact to light touch in bilateral lower extremities Special tests: SLR: negative bilaterally KARLOS: negative bilaterally FADIR: negative bilaterally Yoemans: negative bilaterally Neelam Chery  [facility], Advance PracticeTeam

## 2025-07-15 NOTE — H&P ADULT - NSHPPHYSICALEXAM_GEN_ALL_CORE
PHYSICAL EXAMINATION:  Vital Signs Last 24 Hrs  T(C): 36.7 (12 Dec 2021 13:22), Max: 36.7 (12 Dec 2021 13:22)  T(F): 98 (12 Dec 2021 13:22), Max: 98 (12 Dec 2021 13:22)  HR: 90 (12 Dec 2021 13:22) (88 - 90)  BP: 155/91 (12 Dec 2021 13:22) (138/93 - 155/91)  BP(mean): 112 (12 Dec 2021 13:22) (112 - 112)  RR: 20 (12 Dec 2021 13:22) (18 - 20)  SpO2: 99% (12 Dec 2021 13:22) (99% - 99%)  CAPILLARY BLOOD GLUCOSE            GENERAL: NAD, well-groomed,  HEAD:  atraumatic, normocephalic  EYES: sclera anicteric  ENMT: mucous membranes moist  NECK: supple, No JVD  CHEST/LUNG: clear to auscultation bilaterally;    no      rales   ,   no rhonchi,   HEART: normal S1, S2  ABDOMEN: BS+, soft, ND, NT   EXTREMITIES:   ulcer toe/ a/p amputations  NEURO: awake, ,     moves all extremities  SKIN: no     rash
Carried